# Patient Record
Sex: MALE | Race: WHITE | URBAN - METROPOLITAN AREA
[De-identification: names, ages, dates, MRNs, and addresses within clinical notes are randomized per-mention and may not be internally consistent; named-entity substitution may affect disease eponyms.]

---

## 2017-11-24 ENCOUNTER — HOSPITAL ENCOUNTER (EMERGENCY)
Facility: CLINIC | Age: 32
Discharge: HOME | End: 2017-11-25
Attending: PSYCHIATRY & NEUROLOGY | Admitting: PSYCHIATRY & NEUROLOGY
Payer: COMMERCIAL

## 2017-11-24 DIAGNOSIS — F15.20 METHAMPHETAMINE DEPENDENCE (H): ICD-10-CM

## 2017-11-24 DIAGNOSIS — F11.90 METHADONE USE: ICD-10-CM

## 2017-11-24 LAB — ALCOHOL BREATH TEST: 0 (ref 0–0.01)

## 2017-11-24 PROCEDURE — 99285 EMERGENCY DEPT VISIT HI MDM: CPT | Mod: 25 | Performed by: PSYCHIATRY & NEUROLOGY

## 2017-11-24 PROCEDURE — 99283 EMERGENCY DEPT VISIT LOW MDM: CPT | Mod: Z6 | Performed by: PSYCHIATRY & NEUROLOGY

## 2017-11-24 PROCEDURE — 90791 PSYCH DIAGNOSTIC EVALUATION: CPT

## 2017-11-24 ASSESSMENT — ENCOUNTER SYMPTOMS
DYSPHORIC MOOD: 1
SHORTNESS OF BREATH: 0
HALLUCINATIONS: 0
NERVOUS/ANXIOUS: 1
FEVER: 0
ABDOMINAL PAIN: 0

## 2017-11-24 NOTE — ED AVS SNAPSHOT
[facility], Emergency Department    [address]    [facility] [address]    Phone:  [phone]    Fax:  [phone]                                       Saeed Mccullough   MRN: [id number]    Department:  [facility], Emergency Department   Date of Visit:  11/24/2017           After Visit Summary Signature Page     I have received my discharge instructions, and my questions have been answered. I have discussed any challenges I see with this plan with the nurse or doctor.    ..........................................................................................................................................  Patient/Patient Representative Signature      ..........................................................................................................................................  Patient Representative Print Name and Relationship to Patient    ..................................................               ................................................  Date                                            Time    ..........................................................................................................................................  Reviewed by Signature/Title    ...................................................              ..............................................  Date                                                            Time

## 2017-11-24 NOTE — ED AVS SNAPSHOT
[facility], Emergency Department    [address]    [place] [address]    Phone:  [phone]    Fax:  [phone]                                       Saeed Mccullough   MRN: [id number]    Department:  [facility], Emergency Department   Date of Visit:  11/24/2017           Patient Information     Date Of Birth          1985        Your diagnoses for this visit were:     Methamphetamine dependence (H)     Methadone use (H)        You were seen by Denis Babcock MD.        Discharge Instructions       Go to CD treatment.  Follow up with your [facility]  to get a new CD treatment program set up      24 Hour Appointment Hotline       To make an appointment at any [facility] clinic, call [phone] ([phone]). If you don't have a family doctor or clinic, we will help you find one. [facility] clinics are conveniently located to serve the needs of you and your family.             Review of your medicines      Notice     You have not been prescribed any medications.            Procedures and tests performed during your visit     Alcohol breath test POCT      Orders Needing Specimen Collection     Ordered          11/24/17 2128  Drug abuse screen 6 urine (tox) - STAT, [facility]: STAT, Needs to be Collected     Scheduled Task Status   11/24/17 2129 Collect Drug abuse screen 6 urine (tox) Open   Order Class:  PCU Collect                  Pending Results     No orders found for last 3 day(s).            Pending Culture Results     No orders found for last 3 day(s).            Pending Results Instructions     If you had any lab results that were not finalized at the time of your Discharge, you can call the ED Lab Result RN at [phone]. You will be contacted by this team for any positive Lab results or changes in treatment. The nurses are available 7 days a week from 10A to 6:30P.  You can leave a message 24 hours per day and they will return your call.        Thank you for choosing [facility]      "  Thank you for choosing [facility] for your care. Our goal is always to provide you with excellent care. Hearing back from our patients is one way we can continue to improve our services. Please take a few minutes to complete the written survey that you may receive in the mail after you visit with us. Thank you!        [facility] [employer] Information     3yy game platform lets you send messages to your doctor, view your test results, renew your prescriptions, schedule appointments and more. To sign up, go to [email or web address] . Click on \"Log in\" on the left side of the screen, which will take you to the Welcome page. Then click on \"Sign up Now\" on the right side of the page.     You will be asked to enter the access code listed below, as well as some personal information. Please follow the directions to create your username and password.     Your access code is: [email or web address]  Expires: 2018  9:07 AM     Your access code will  in 90 days. If you need help or a new code, please call your [facility] clinic or [phone].        Care EveryWhere ID     This is your Care EveryWhere ID. This could be used by other organizations to access your [facility] medical records  [id number]        Equal Access to Services     DOUGLAS CARTAGENA : Jayleen Escalera, wafrankyda verenice, qaybta kaalmada adefe, alyssa reddy. So [facility] [phone].    ATENCIÓN: Si habla español, tiene a cristina disposición servicios gratuitos de asistencia lingüística. Llame al [phone].    We comply with applicable federal civil rights laws and [place] laws. We do not discriminate on the basis of race, color, national origin, age, disability, sex, sexual orientation, or gender identity.            After Visit Summary       This is your record. Keep this with you and show to your community pharmacist(s) and doctor(s) at your next visit.                  "

## 2017-11-25 VITALS
OXYGEN SATURATION: 99 % | DIASTOLIC BLOOD PRESSURE: 64 MMHG | TEMPERATURE: 96 F | RESPIRATION RATE: 16 BRPM | HEART RATE: 111 BPM | SYSTOLIC BLOOD PRESSURE: 147 MMHG

## 2017-11-25 NOTE — ED NOTES
Patient arrives to [facility]. Psych Associate explains process, gives patient urine cup and questionnaire. Patient told about meeting with Mental Health  and Psychiatrist. Patient told about 2-5 hour time frame for complete evaluation.

## 2017-11-25 NOTE — ED NOTES
Found passed out at [facility].  Has been off medications. Uses Methadone. Stated he was suicidal and was brought here for an evaluation.

## 2017-11-25 NOTE — ED PROVIDER NOTES
The provider was accepted at shift change sign out with plan to have him reassessed by the [facility]  in the morning.  The  did attempt him prior to shift change, though he seemed quite groggy, and did not provide a useful history.  There were no other acute events overnight.  We will allow him to sleep a little bit longer, and have the morning  attempt to see him.  He may need to get some collateral information from family.  She'll be signed out to the oncoming provider pending the above.    Dictation Disclaimer: Some of this Note has been completed with voice-recognition dictation software. Although errors are generally corrected real-time, there is the potential for a rare error to be present in the completed chart.       Gissel Barrera MD  11/25/17 0786

## 2017-11-25 NOTE — ED NOTES
Bed: ED16  Expected date: 11/24/17  Expected time: 9:15 PM  Means of arrival:   Comments:  Janet Gomez 32M/MELL reyes

## 2017-11-25 NOTE — ED PROVIDER NOTES
History     Chief Complaint   Patient presents with     Suicidal     Found passed out at [facility].  Has been off medications. Uses Methadone. Stated he was suicidal and was brought here for an evaluation.     The history is provided by the patient and medical records.     Saeed Mccullough is a 32 year old male who comes in due to him being found passed out in the bathroom at [facility].  He used some methadone and meth together. He also had one beer.  He has been using daily methamphetamine.  He went to [facility] treatment but relapsed the day he discharged one month ago. He talks about feeling sick (he threw up in the bathroom at [facility]) and using too many drugs. He states he might be suicidal but is not able to state more. He struggles to answer many questions and is lying on the floor. He is too intoxicated to get much information or due much of an assessment.      Please see the 's assessment in [facility] from today for further details.    I have reviewed the Medications, Allergies, Past Medical and Surgical History, and Social History in the Epic system.    Review of Systems   Constitutional: Negative for fever.   Respiratory: Negative for shortness of breath.    Cardiovascular: Negative for chest pain.   Gastrointestinal: Negative for abdominal pain.   Psychiatric/Behavioral: Positive for dysphoric mood and suicidal ideas (passive). Negative for hallucinations and self-injury. The patient is nervous/anxious.    All other systems reviewed and are negative.      Physical Exam   BP: 133/74  Pulse: 111  Temp: 96  F (35.6  C)  Resp: 16  SpO2: 98 %      Physical Exam   Constitutional: He is oriented to person, place, and time. He appears well-developed and well-nourished.   HENT:   Head: Normocephalic and atraumatic.   Mouth/Throat: Oropharynx is clear and moist. No oropharyngeal exudate.   Eyes: Pupils are equal, round, and reactive to light.   Neck: Normal range of motion. Neck supple.  "  Cardiovascular: Normal rate, regular rhythm and normal heart sounds.    Pulmonary/Chest: Effort normal and breath sounds normal. No respiratory distress.   Abdominal: Soft. Bowel sounds are normal. There is no tenderness.   Musculoskeletal: Normal range of motion.   Neurological: He is alert and oriented to person, place, and time.   Skin: Skin is warm. No rash noted.   Psychiatric: His speech is normal and behavior is normal. His affect is inappropriate. He is not actively hallucinating. Thought content is not paranoid and not delusional. Cognition and memory are impaired (due to intoxication). He expresses inappropriate judgment. He exhibits a depressed mood. He expresses suicidal (vague, passive) ideation. He expresses no homicidal ideation. He expresses no suicidal plans and no homicidal plans.   Saeed is a 33 y/o male who looks his age.  He is disheveled.  He has poor eye contact.   Nursing note and vitals reviewed.      ED Course     ED Course     Procedures               Labs Ordered and Resulted from Time of ED Arrival Up to the Time of Departure from the ED   ALCOHOL BREATH TEST POCT - Normal   DRUG ABUSE SCREEN 6 CHEM DEP URINE ([facility])            Assessments & Plan (with Medical Decision Making)   Saeed will sleep in the ED due to his intoxication. He is not able to give much information about his possible suicidal thoughts. He is vague and mostly talks about being \"sick\" and using too many drugs. He most likely will be able to be discharged in the am as once sober, it is expected he will not be suicidal. He will need to have a reassessment at that time to make sure. He should follow up with his [facility]  and get into a CD treatment program.    I have reviewed the nursing notes.    I have reviewed the findings, diagnosis, plan and need for follow up with the patient.    New Prescriptions    No medications on file       Final diagnoses:   Methamphetamine dependence (H)   Methadone use (H) "       11/24/2017   [facility], [place], EMERGENCY DEPARTMENT     Denis Babcock MD  11/24/17 2876

## 2017-11-25 NOTE — CONSULTS
"DEC     This  attempted to meet with pt to assess SI however pt remains impaired and vague and unable to provide coherent, reliable responses to questions.      This  asked if pt was depressed or had any thoughts of hurting himself.  He responded, \"I don't really have any hope.  I don't feel depressed.\"  Pt went to CD tx, a 60-90 day program however left after only 23 days.  \"I left because I didn't want to d the aftercare.  I thought I was better.  Socially I was better.\"  Asked about OP MH providers and pt denied having any.  Asked about medications and pt stated he had decided to stop taking his medications three weeks ago.  \"I missed my appointment with psychiatry,\" however when I inquired about providers he was unable to report who he was scheduled to see.  Pt has his head down in his hands during our conversation and talks to himself with disconnected random statements.    Updated RN and MD.  Pt stated he was hungry so a meal was provided.    Will ask day time  to re-evaluate pt when MS improves.    Gabriela Garcia, MSW, [facility]  "

## 2018-01-09 ENCOUNTER — HOSPITAL ENCOUNTER (INPATIENT)
Facility: CLINIC | Age: 33
LOS: 9 days | Discharge: HOME | End: 2018-01-19
Attending: EMERGENCY MEDICINE | Admitting: PSYCHIATRY & NEUROLOGY
Payer: COMMERCIAL

## 2018-01-09 DIAGNOSIS — F41.9 ANXIETY: ICD-10-CM

## 2018-01-09 DIAGNOSIS — F15.10 METHAMPHETAMINE ABUSE (H): ICD-10-CM

## 2018-01-09 DIAGNOSIS — R45.851 SUICIDE IDEATION: Primary | ICD-10-CM

## 2018-01-09 DIAGNOSIS — T43.632A: ICD-10-CM

## 2018-01-09 DIAGNOSIS — F33.3 SEVERE RECURRENT MAJOR DEPRESSIVE DISORDER WITH PSYCHOTIC FEATURES (H): ICD-10-CM

## 2018-01-09 DIAGNOSIS — R00.0 SINUS TACHYCARDIA: ICD-10-CM

## 2018-01-09 LAB
ALBUMIN SERPL-MCNC: 4 G/DL (ref 3.4–5)
ALP SERPL-CCNC: 65 U/L (ref 40–150)
ALT SERPL W P-5'-P-CCNC: 37 U/L (ref 0–70)
ANION GAP SERPL CALCULATED.3IONS-SCNC: 5 MMOL/L (ref 3–14)
APAP SERPL-MCNC: <2 MG/L (ref 10–20)
AST SERPL W P-5'-P-CCNC: 12 U/L (ref 0–45)
BASOPHILS # BLD AUTO: 0 10E9/L (ref 0–0.2)
BASOPHILS NFR BLD AUTO: 0.1 %
BILIRUB SERPL-MCNC: 0.5 MG/DL (ref 0.2–1.3)
BUN SERPL-MCNC: 14 MG/DL (ref 7–30)
CALCIUM SERPL-MCNC: 9.1 MG/DL (ref 8.5–10.1)
CHLORIDE SERPL-SCNC: 104 MMOL/L (ref 94–109)
CO2 SERPL-SCNC: 32 MMOL/L (ref 20–32)
CREAT SERPL-MCNC: 1.11 MG/DL (ref 0.66–1.25)
DIFFERENTIAL METHOD BLD: ABNORMAL
EOSINOPHIL # BLD AUTO: 0 10E9/L (ref 0–0.7)
EOSINOPHIL NFR BLD AUTO: 0.1 %
ERYTHROCYTE [DISTWIDTH] IN BLOOD BY AUTOMATED COUNT: 11.5 % (ref 10–15)
GFR SERPL CREATININE-BSD FRML MDRD: 78 ML/MIN/1.7M2
GLUCOSE SERPL-MCNC: 100 MG/DL (ref 70–99)
HCT VFR BLD AUTO: 45.7 % (ref 40–53)
HGB BLD-MCNC: 15.8 G/DL (ref 13.3–17.7)
IMM GRANULOCYTES # BLD: 0 10E9/L (ref 0–0.4)
IMM GRANULOCYTES NFR BLD: 0.2 %
LYMPHOCYTES # BLD AUTO: 1.6 10E9/L (ref 0.8–5.3)
LYMPHOCYTES NFR BLD AUTO: 11.1 %
MCH RBC QN AUTO: 31.3 PG (ref 26.5–33)
MCHC RBC AUTO-ENTMCNC: 34.6 G/DL (ref 31.5–36.5)
MCV RBC AUTO: 91 FL (ref 78–100)
MONOCYTES # BLD AUTO: 0.7 10E9/L (ref 0–1.3)
MONOCYTES NFR BLD AUTO: 5 %
NEUTROPHILS # BLD AUTO: 12 10E9/L (ref 1.6–8.3)
NEUTROPHILS NFR BLD AUTO: 83.5 %
NRBC # BLD AUTO: 0 10*3/UL
NRBC BLD AUTO-RTO: 0 /100
PLATELET # BLD AUTO: 329 10E9/L (ref 150–450)
POTASSIUM SERPL-SCNC: 3.5 MMOL/L (ref 3.4–5.3)
PROT SERPL-MCNC: 7.7 G/DL (ref 6.8–8.8)
RBC # BLD AUTO: 5.04 10E12/L (ref 4.4–5.9)
SALICYLATES SERPL-MCNC: <2 MG/DL
SODIUM SERPL-SCNC: 141 MMOL/L (ref 133–144)
WBC # BLD AUTO: 14.3 10E9/L (ref 4–11)

## 2018-01-09 PROCEDURE — 99285 EMERGENCY DEPT VISIT HI MDM: CPT | Mod: 25 | Performed by: EMERGENCY MEDICINE

## 2018-01-09 PROCEDURE — 85025 COMPLETE CBC W/AUTO DIFF WBC: CPT | Performed by: EMERGENCY MEDICINE

## 2018-01-09 PROCEDURE — 80307 DRUG TEST PRSMV CHEM ANLYZR: CPT | Performed by: FAMILY MEDICINE

## 2018-01-09 PROCEDURE — 80053 COMPREHEN METABOLIC PANEL: CPT | Performed by: EMERGENCY MEDICINE

## 2018-01-09 PROCEDURE — 93005 ELECTROCARDIOGRAM TRACING: CPT | Performed by: EMERGENCY MEDICINE

## 2018-01-09 PROCEDURE — 80329 ANALGESICS NON-OPIOID 1 OR 2: CPT | Performed by: EMERGENCY MEDICINE

## 2018-01-09 PROCEDURE — 93010 ELECTROCARDIOGRAM REPORT: CPT | Mod: Z6 | Performed by: EMERGENCY MEDICINE

## 2018-01-09 PROCEDURE — 80320 DRUG SCREEN QUANTALCOHOLS: CPT | Performed by: FAMILY MEDICINE

## 2018-01-09 ASSESSMENT — ENCOUNTER SYMPTOMS
ABDOMINAL PAIN: 0
FEVER: 0
ARTHRALGIAS: 0
HEADACHES: 0
DYSPHORIC MOOD: 1
EYE REDNESS: 0
COLOR CHANGE: 0
DIFFICULTY URINATING: 0
CONFUSION: 0
NECK STIFFNESS: 0
SHORTNESS OF BREATH: 0

## 2018-01-09 NOTE — IP AVS SNAPSHOT
32    [address]    [place] [address]    Phone:  [phone]                                       After Visit Summary   1/9/2018    Saeed Mccullough    MRN: [id number]           After Visit Summary Signature Page     I have received my discharge instructions, and my questions have been answered. I have discussed any challenges I see with this plan with the nurse or doctor.    ..........................................................................................................................................  Patient/Patient Representative Signature      ..........................................................................................................................................  Patient Representative Print Name and Relationship to Patient    ..................................................               ................................................  Date                                            Time    ..........................................................................................................................................  Reviewed by Signature/Title    ...................................................              ..............................................  Date                                                            Time

## 2018-01-09 NOTE — IP AVS SNAPSHOT
MRN:[id number]                      After Visit Summary   1/9/2018    Saeed Mccullough    MRN: [id number]           Thank you!     Thank you for choosing [facility] for your care. Our goal is always to provide you with excellent care.        Patient Information     Date Of Birth          1985        Designated Caregiver       Most Recent Value    Caregiver    Will someone help with your care after discharge? yes    Name of designated caregiver Melissa Mccullough (mother)    Phone number of caregiver [phone]    Caregiver address [address] [place], [place]      About your hospital stay     You were admitted on:  January 10, 2018 You last received care in the:  [facility] 30[facility]    You were discharged on:  January 19, 2018       Who to Call     For medical emergencies, please call 911.  For non-urgent questions about your medical care, please call your primary care provider or clinic, None          Attending Provider     Provider Specialty    Lm Santana MD Emergency Medicine    Adilson Pang MD Psychiatry       Primary Care Provider Fax #    Physician No Ref-Primary [phone]      Further instructions from your care team        Behavioral Discharge Planning and Instructions      Summary:  You were admitted on 1/9/2018  due to sucidal ideation.  You were treated by Dr. Adilson Pang MD and discharged on 01/19/2018 from [facility] to Home with your mother while you await a bed opening at [facility].      Principal Diagnosis:    Unspecified depressive disorder,         Rule out a substance-induced mood disorder (amphetamines).          Rule out a primary mood disorder (MDD versus bipolar disorder),         Rule out obsessive-compulsive disorder.   Stimulant use disorder, amphetamine type substance, severe.   Cannabis use disorder, mild    Health Care Follow-up Appointments:   1. Chemical Dependency Treatment;  You were referred to [facility] residential treatment program.  At the  time of your discharge a bed was not available, however you are to contact [facility] [phone] everyday including weekends to check and see if a bed is available.    2. Medication Management Appointment:  Date: Thursday, February 1st, 2018  Time: 1:30pm   Provider: Aurora Thomas MD  The location for your upcoming appointment:  [facility]  [address]  [address]  [facility] [address]   Phone: [phone]   The Health Unit Coordinator has faxed these discharge instructions to Fax: [phone]  Attend all scheduled appointments with your outpatient providers. Call at least 24 hours in advance if you need to reschedule an appointment to ensure continued access to your outpatient providers.   Major Treatments, Procedures and Findings:  You were provided with: a psychiatric assessment, assessed for medical stability, medication evaluation and/or management, group therapy, individual therapy, CD evaluation/assessment, milieu management and medical interventions    Symptoms to Report: feeling more aggressive, increased confusion, losing more sleep, mood getting worse or thoughts of suicide    Early warning signs can include: increased depression or anxiety sleep disturbances increased thoughts or behaviors of suicide or self-harm  increased unusual thinking, such as paranoia or hearing voices    Safety and Wellness:  Take all medicines as directed.  Make no changes unless your doctor suggests them.      Follow treatment recommendations.  Refrain from alcohol and non-prescribed drugs.  If there is a concern for safety, call 911.    Resources:   COPE (Community Outreach for Psychiatric Emergencies): [phone].Available 24-hours a day, 7 days a week. Call a COPE professional when a severe disturbance of mood or thinking is impacting your safety. COPE professionals are available to go where you are, handle an immediate crisis, and provide a clinical assessment. If needed, COPE will arrange an  emergency evaluation for inpatient psychiatric services. Telephone consultations are also available. Ask them if you meet criteria for a crisis residence.   *You can also talk to COPE about crisis stabilization services if you are experiencing difficulty with the transition from the hospital.  [facility] Acute Psychiatric Services  Acute Psychiatric Services/Crisis Intervention: [phone]  24-hour walk-in crisis intervention and treatment of behavioral emergencies    Located at:  [address] [facility] [address] [facility] -  in the Emergency room on the first floor of the [facility]  [address] [place] [address] [place] [address]  Phone: [phone]  This is a crisis residence where you can stay for a short time if you feel like you need to stabilize but do not need to go to the hospital.    Crisis Connection 24/7 Community Call Center: [phone]  Phone: [phone] outside the [facility] area: [phone]  [email or web address]   Hours: 24 hours/day, 7 days/week  Services: Free and confidential telephone counseling provided by trained volunteers supervised by professional staff. Early intervention and brief supportive counseling for callers with issues of depression, stress and anxiety, domestic violence, parent/child conflicts, family issues, loneliness, grief and loss, suicidal ideation and chronic mental illness.    [facility] [employer] [facility]  You and your family would benefit from the support groups at [facility].   Www.namihelps.org    www.[facility].org    The [employer] [facility] has a parent support and educator staff - Crow [place] - that can be a support for your parents. There are also many classes that are available to you and your family.  Crow can be reached at [phone] xt 106 or through e-mail at [email or web address].    Mya,  please take care and make your recovery a daily  "recovery. The treatment team has appreciated the opportunity to work with you.   If you have any questions or concerns our unit number is [phone].          Pending Results     No orders found from 2018 to 1/10/2018.            Admission Information     Date & Time Provider Department Dept. Phone    2018 Adilson Pang MD [facility] 30NR [phone]      Your Vitals Were     Blood Pressure Pulse Temperature Respirations Height Weight    123/87 (BP Location: Right arm) 95 97  F (36.1  C) 16 1.727 m (5' 8\") 84.8 kg (187 lb)    Pulse Oximetry BMI (Body Mass Index)                96% 28.43 kg/m2          MyChart Information     [facility] lets you send messages to your doctor, view your test results, renew your prescriptions, schedule appointments and more. To sign up, go to [email or web address] . Click on \"Log in\" on the left side of the screen, which will take you to the Welcome page. Then click on \"Sign up Now\" on the right side of the page.     You will be asked to enter the access code listed below, as well as some personal information. Please follow the directions to create your username and password.     Your access code is: [email or web address]  Expires: 2018  9:07 AM     Your access code will  in 90 days. If you need help or a new code, please call your [place] clinic or [phone].        Care EveryWhere ID     This is your Care EveryWhere ID. This could be used by other organizations to access your [facility] [place] medical records  [id number]        Equal Access to Services     [facility]: Hadii wisam patterson Soligia, waaxda luqadaha, qaybta kaalmaalyssa guidry. So [facility] [phone].    ATENCIÓN: Si habla español, tiene a cristina disposición servicios gratuitos de asistencia lingüística. Llame al [phone].    We comply with applicable federal civil rights laws and [place] laws. We do not discriminate on the basis of race, color, national origin, age, " disability, sex, sexual orientation, or gender identity.               Review of your medicines      START taking        Dose / Directions    FLUoxetine 20 MG capsule   Commonly known as:  PROzac   Used for:  Severe recurrent major depressive disorder with psychotic features (H)        Dose:  20 mg   Start taking on:  1/20/2018   Take 1 capsule (20 mg) by mouth daily   Quantity:  30 capsule   Refills:  0       gabapentin 400 MG capsule   Commonly known as:  NEURONTIN   Used for:  Anxiety        Dose:  400 mg   Take 1 capsule (400 mg) by mouth 3 times daily   Quantity:  90 capsule   Refills:  0       traZODone 150 MG tablet   Commonly known as:  DESYREL   Used for:  Severe recurrent major depressive disorder with psychotic features (H)        Dose:  150 mg   Take 1 tablet (150 mg) by mouth nightly as needed for sleep   Quantity:  30 tablet   Refills:  0       trifluoperazine 2 MG tablet   Commonly known as:  STELAZINE   Used for:  Severe recurrent major depressive disorder with psychotic features (H), Anxiety        Dose:  4-8 mg   Take 2-4 tablets (4-8 mg) by mouth 3 times daily as needed (severe anxiety or hallucinations)   Quantity:  30 tablet   Refills:  0         CONTINUE these medicines which may have CHANGED, or have new prescriptions. If we are uncertain of the size of tablets/capsules you have at home, strength may be listed as something that might have changed.        Dose / Directions    OLANZapine 20 MG tablet   Commonly known as:  zyPREXA   This may have changed:    - medication strength  - how much to take  - when to take this   Used for:  Severe recurrent major depressive disorder with psychotic features (H)        Dose:  20 mg   Take 1 tablet (20 mg) by mouth At Bedtime   Quantity:  30 tablet   Refills:  0            Where to get your medicines      These medications were sent to [place], [place] - [address]  [address] [address]     Phone:   [phone]     FLUoxetine 20 MG capsule    gabapentin 400 MG capsule    OLANZapine 20 MG tablet    traZODone 150 MG tablet    trifluoperazine 2 MG tablet                Protect others around you: Learn how to safely use, store and throw away your medicines at www.disposemymeds.org.             Medication List: This is a list of all your medications and when to take them. Check marks below indicate your daily home schedule. Keep this list as a reference.      Medications           Morning Afternoon Evening Bedtime As Needed    FLUoxetine 20 MG capsule   Commonly known as:  PROzac   Take 1 capsule (20 mg) by mouth daily   Start taking on:  1/20/2018   Last time this was given:  20 mg on 1/19/2018  8:48 AM                                gabapentin 400 MG capsule   Commonly known as:  NEURONTIN   Take 1 capsule (400 mg) by mouth 3 times daily   Last time this was given:  300 mg on 1/19/2018  2:32 PM                                OLANZapine 20 MG tablet   Commonly known as:  zyPREXA   Take 1 tablet (20 mg) by mouth At Bedtime   Last time this was given:  20 mg on 1/18/2018  9:29 PM                                traZODone 150 MG tablet   Commonly known as:  DESYREL   Take 1 tablet (150 mg) by mouth nightly as needed for sleep   Last time this was given:  150 mg on 1/18/2018  9:29 PM                                trifluoperazine 2 MG tablet   Commonly known as:  STELAZINE   Take 2-4 tablets (4-8 mg) by mouth 3 times daily as needed (severe anxiety or hallucinations)   Last time this was given:  8 mg on 1/18/2018  9:29 PM

## 2018-01-10 PROBLEM — R45.851 SUICIDE IDEATION: Status: ACTIVE | Noted: 2018-01-10

## 2018-01-10 LAB
AMPHETAMINES UR QL SCN: POSITIVE
BARBITURATES UR QL: NEGATIVE
BENZODIAZ UR QL: NEGATIVE
CANNABINOIDS UR QL SCN: POSITIVE
COCAINE UR QL: NEGATIVE
ETHANOL UR QL SCN: NEGATIVE
INTERPRETATION ECG - MUSE: NORMAL
OPIATES UR QL SCN: NEGATIVE

## 2018-01-10 PROCEDURE — 99223 1ST HOSP IP/OBS HIGH 75: CPT | Mod: AI | Performed by: PSYCHIATRY & NEUROLOGY

## 2018-01-10 PROCEDURE — 25000132 ZZH RX MED GY IP 250 OP 250 PS 637: Performed by: EMERGENCY MEDICINE

## 2018-01-10 PROCEDURE — 25000132 ZZH RX MED GY IP 250 OP 250 PS 637: Performed by: PSYCHIATRY & NEUROLOGY

## 2018-01-10 PROCEDURE — 12400007 ZZH R&B MH INTERMEDIATE UMMC

## 2018-01-10 RX ORDER — OLANZAPINE 5 MG/1
10 TABLET, ORALLY DISINTEGRATING ORAL ONCE
Status: COMPLETED | OUTPATIENT
Start: 2018-01-10 | End: 2018-01-10

## 2018-01-10 RX ORDER — HYDROXYZINE HYDROCHLORIDE 25 MG/1
25-50 TABLET, FILM COATED ORAL EVERY 4 HOURS PRN
Status: DISCONTINUED | OUTPATIENT
Start: 2018-01-10 | End: 2018-01-19 | Stop reason: HOSPADM

## 2018-01-10 RX ORDER — OLANZAPINE 10 MG/1
10 TABLET, ORALLY DISINTEGRATING ORAL DAILY PRN
Status: DISCONTINUED | OUTPATIENT
Start: 2018-01-10 | End: 2018-01-19 | Stop reason: HOSPADM

## 2018-01-10 RX ORDER — OLANZAPINE 10 MG/2ML
10 INJECTION, POWDER, FOR SOLUTION INTRAMUSCULAR DAILY PRN
Status: CANCELLED | OUTPATIENT
Start: 2018-01-10

## 2018-01-10 RX ORDER — OLANZAPINE 10 MG/1
10 TABLET ORAL AT BEDTIME
Status: DISCONTINUED | OUTPATIENT
Start: 2018-01-10 | End: 2018-01-11

## 2018-01-10 RX ORDER — ACETAMINOPHEN 325 MG/1
650 TABLET ORAL EVERY 4 HOURS PRN
Status: DISCONTINUED | OUTPATIENT
Start: 2018-01-10 | End: 2018-01-19 | Stop reason: HOSPADM

## 2018-01-10 RX ORDER — GABAPENTIN 100 MG/1
100 CAPSULE ORAL 3 TIMES DAILY
Status: DISCONTINUED | OUTPATIENT
Start: 2018-01-10 | End: 2018-01-11

## 2018-01-10 RX ORDER — TRAZODONE HYDROCHLORIDE 150 MG/1
150 TABLET ORAL
Status: DISCONTINUED | OUTPATIENT
Start: 2018-01-10 | End: 2018-01-19 | Stop reason: HOSPADM

## 2018-01-10 RX ADMIN — TRAZODONE HYDROCHLORIDE 150 MG: 150 TABLET ORAL at 21:43

## 2018-01-10 RX ADMIN — OLANZAPINE 10 MG: 10 TABLET, FILM COATED ORAL at 21:41

## 2018-01-10 RX ADMIN — GABAPENTIN 100 MG: 100 CAPSULE ORAL at 21:41

## 2018-01-10 RX ADMIN — OLANZAPINE 10 MG: 5 TABLET, ORALLY DISINTEGRATING ORAL at 01:06

## 2018-01-10 RX ADMIN — HYDROXYZINE HYDROCHLORIDE 50 MG: 25 TABLET ORAL at 02:08

## 2018-01-10 ASSESSMENT — ACTIVITIES OF DAILY LIVING (ADL)
AMBULATION: 0-->INDEPENDENT
DRESS: INDEPENDENT;SCRUBS (BEHAVIORAL HEALTH);STREET CLOTHES
RETIRED_COMMUNICATION: 0-->UNDERSTANDS/COMMUNICATES WITHOUT DIFFICULTY
COGNITION: 2 - DIFFICULTY WITH ORGANIZING THOUGHTS
GROOMING: INDEPENDENT
TOILETING: 0-->INDEPENDENT
RETIRED_EATING: 0-->INDEPENDENT
DRESS: STREET CLOTHES;INDEPENDENT
DRESS: 0-->INDEPENDENT
LAUNDRY: UNABLE TO COMPLETE
ORAL_HYGIENE: INDEPENDENT
FALL_HISTORY_WITHIN_LAST_SIX_MONTHS: NO
SWALLOWING: 0-->SWALLOWS FOODS/LIQUIDS WITHOUT DIFFICULTY
LAUNDRY: UNABLE TO COMPLETE
BATHING: 0-->INDEPENDENT
GROOMING: INDEPENDENT
TRANSFERRING: 0-->INDEPENDENT
ORAL_HYGIENE: INDEPENDENT
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
## 2018-01-10 NOTE — ED NOTES
"Patient hitting self in head with tissue box, yelling \"F--k!\"  Patient informed not to hit/harm self, verbalized understanding.  Will continue to monitor.  "
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
## 2018-01-10 NOTE — H&P
"INITIAL PSYCHIATRIC HISTORY AND PHYSICAL       DATE OF ASSESSMENT:  01/10/2018      IDENTIFYING INFORMATION:  The patient is a 32-year-old single  male currently residing with his mother.      CHIEF COMPLAINT:  \"I get really bad thoughts and I just cannot take it sometimes.\"      HISTORY OF PRESENT ILLNESS:  The patient has a history of prominent methamphetamine usage, further complicated by mood and possibly psychotic symptoms.  He was brought to the emergency room by EMS after being found on the [address] [facility] bridge with the intent to jump to commit suicide.  Records indicate that he had also ingested stimulants and methamphetamine prior to the incident.  He was placed on a 72-hour hold and transferred to our behavioral health unit.        On examination today, he tells me that approximately 3 months ago he was hospitalized for his first mental health hospitalization following his first suicide attempt via overdose on medications.  After spending a few days at the [facility], his mental health symptoms were stabilized on a combination of Zyprexa and gabapentin.  He vaguely recalls being diagnosed with schizophrenia at that time.  After gaining stability, he was transitioned to the [facility] chemical addiction treatment facility where he maintained residence for a little over 20 days.  During that time he recalls that he felt very well, maintaining mood stability and eventually felt that he no longer needed the support of a facility given the improvements he had made.  He then discharged himself from the facility and returned back home with his mother.  He discontinued the medication on his own, questioning whether he needed them and sometime afterwards intrusive thoughts reemerged.  He describes these intrusive thoughts as being his primary mental health symptom.  When these thoughts occur, he experiences thoughts of wanting to rape people, although is not able to identify any " specific person.  He does not desire acting on these thoughts and therefore experiences much emotional distress as he attempts to minimize these thoughts as much as possible.  He has attempted to utilize pornography to distract himself from these thoughts in the past, however, to no avail.  More recently, he has been using illicit substances, mostly in the form of smoking methamphetamine, to help distract himself from the intensity of these thoughts.  On the day of his admission, these thoughts had intensified to where he no longer desired living if he needed to keep experiencing these intrusive thoughts.  He then contemplated suicide as a means of escaping this occurrence and had utilized stimulants and methamphetamine, then proceeded with a plan to go to the bridge while contemplating jumping as means of suicide.  Today, the intensity of symptoms has subsided some to where he is not endorsing any active suicidal thoughts.  He is hopeful to regain medications which have been helpful for him in the past and is looking forward to regaining referrals for residential treatment.      PSYCHIATRIC REVIEW OF SYSTEMS:  Regarding a depressive episode, he endorsed depressed mood, characterized as severe, mostly secondary to distress associated with ruminative thoughts of rape.  He denied anhedonia.  He does feel helpless and hopeless.  Energy is low, however, complicated by stimulant withdrawal.  Concentration is poor.  Appetite is low.  Again, he denies active suicidal and homicidal thoughts.  He was not able to endorse manic symptoms unrelated to substance usage.  Regarding psychosis, he denied auditory hallucinations; however, described a strong presence of thoughts and hearing his own conscious influencing intrusive thoughts of rape.  He denied overt paranoid delusions or visual hallucinations or thought insertion, thought extraction, no ideas of reference.  No symptoms corresponding to PTSD, KEESHA, eating disorder.   Regarding OCD, he described intrusive obsessional thoughts pertaining to raping others.  He described these thoughts as emerging without any environmental trigger.  He finds these thoughts intrusive and uncomfortable to experience.  He clarifies no actual intent or desire to act out these thoughts.  These thoughts cause him emotional distress, that he has resorted to pornography to help alleviate the intensities.  No clear compulsions identified.  He tells me that he has not acted on these thoughts to rape others.      PAST PSYCHIATRIC HISTORY:  One prior inpatient hospitalization a few months ago through the [facility]'s mental health unit following his first suicide attempt where he overdosed on a handful of an unknown medication.  He currently does not have any outpatient mental health providers.  He was previously prescribed a combination of Zyprexa and gabapentin during his last hospitalization and found those to be helpful.  No history of SIB reported.  No history of legal commitments reported.      SUBSTANCE ABUSE HISTORY:  Drug of choice is methamphetamines which he has been using on and off over the past 3 or 4 years.  He typically smokes the substance.  He denied injecting illicit substances, denied significant alcohol usage.  He did report occasional cannabis usage with no specific pattern of use or dependence.  He has been to treatment 1 time through [facility] approximately 2 months ago.      MEDICAL HISTORY:  No active issues.      FAMILY HISTORY:  He suspects that a parent and cousin have mental illness, however, is not certain of a specific diagnosis.  No knowledge of suicides in the family.      SOCIAL HISTORY:  Refer to the psychosocial assessment completed by our .  He tells me that he is currently single, does not have children and resides with his mother.  He is unemployed and is not in any romantic relationships at the moment.  He denied any legal issues.      MEDICAL REVIEW OF  SYSTEMS:  A 10-point systems were reviewed and were positive for psychiatric symptoms as noted above, otherwise negative.      PHYSICAL EXAMINATION:   VITAL SIGNS:  Blood pressure is 135/89, respirations 16, pulse 98, temperature 97.4.  Weight is 182 pounds.      The rest of the physical examination was reviewed in the emergency room documentation.      MENTAL STATUS EXAMINATION:  The patient appears his stated age, appropriately dressed in hospital scrubs, lying in bed, unkempt, disheveled.  Eye contact was limited, mostly looking down.  He woke up from his sleep and sat up in bed.  He would frequently scratch his head quite intensely in an anxious fashion.  Most responses were limited to 3 or 4 words at most.  Speech was soft in volume, not pushed or pressured.  No psychomotor abnormalities.  Mood was described as being sad.  Affect was blunted.  Thought process was linear and tight.  Thought content did not display evidence of overt psychosis.  He did not appear paranoid.  He did not appear to be responding to any psychotic stimuli.  He denied active suicidal and homicidal thoughts.  Associations were intact.  Insight and judgment appear fair.  Cognition appeared intact to interviewer including orientation to person, place, time and situation, use of language, fund of knowledge, recent and remote memory.  Muscle strength, tone and gait appear normal on visual inspection.      ASSESSMENT:   1.  Unspecified depressive disorder,         Rule out a substance-induced mood disorder (amphetamines).          Rule out a primary mood disorder (MDD versus bipolar disorder),         Rule out obsessive-compulsive disorder.   2.  Stimulant use disorder, amphetamine type substance, severe.   3.  Cannabis use disorder, mild     PLAN:   1.  The patient has been admitted to [facility] under a 72-hour hold for depressed mood and suicidal thoughts.  Treatment will be continued voluntarily.   2.  The patient reports gaining a  positive response from a combination of Zyprexa and gabapentin to address mood and anxiety symptoms.  I will restart these medications as we continue assessments to further evaluate his diagnosis to ensure appropriate treatment plan.   3.  I discussed with him pursuing neuropsychological testing to help gain diagnostic clarification, which he was agreeable for.  I will await reduction of amphetamine withdrawal symptoms before pursuing the evaluation to ensure good cooperation and accurate testing.   4.  Labs were reviewed including a drug screen positive for cannabis and amphetamines.  CMP and CBC are within normal limits.  We will also order a TSH to rule out other potential factors complicating mood symptoms along with a lipid panel for baseline functioning as we plan to initiate Zyprexa.   5.  Psychosocial treatments to be addressed with social work consult and groups.  The patient is agreeable to initiate a chemical health assessment to explore residential treatment options.   6.  Anticipated hospital stay of 1 week as we target improvement in mood with remission of suicidal thoughts and formulate a plan of care to affectively transition his care out of the hospital.         PARKER RODRIGUEZ MD             D: 01/10/2018 13:15   T: 01/10/2018 14:08   MT: SK      Name:     BRANDIN RUIZ   MRN:      [id number]        Account:      [id number]   :      1985           Admitted:     [id number]      Document: [id number]

## 2018-01-10 NOTE — PROGRESS NOTES
"Pt was in the milieu only for meals.  He denies SIB.  Confirms SI thoughts.  Pt wants to talk with CTC to find out about a place to live.  \"My priority today is to just get some rest.\"  "

## 2018-01-10 NOTE — ED PROVIDER NOTES
"  History     Chief Complaint   Patient presents with     Suicidal     Suicidal with plans to jump off a bridge     Drug Overdose     Took 9 of mom's pills \"methylmine\"at 1200, patient states he was \"trying to change my attitude\"     Homicidal     HI towards \"you and everyone\"     HPI  Saeed Mccullough is a 32 year old male who presents to the emergency department via EMS with concern for suicide ideations.  The [place] [address] [place] attempting to [place].  EMS was subsequently called and patient was transported here to the emergency department.  Patient states he has been having increasing suicide ideations for the past day.  He states that he took 9 of his mother's methylphenidate pills at noon today.  He states that he was doing this to try to \"amp myself up\" in order to cut his wrists.  He denies any other ingestion.  He reports a history of depression but does not have any outpatient providers and does not take any antidepressants.  He reports ongoing methamphetamine abuse.  He states he last snorted meth yesterday.  He denies any hallucinations.  He denies alcohol use.  Patient denies any symptoms currently.  He denies any chest pain or dyspnea.  No abdominal pain.  No nausea or vomiting.  He denies any recent illness.  He denies any medical concerns.    I have reviewed the Medications, Allergies, Past Medical and Surgical History, and Social History in the Epic system.    Review of Systems   Constitutional: Negative for fever.   HENT: Negative for congestion.    Eyes: Negative for redness.   Respiratory: Negative for shortness of breath.    Cardiovascular: Negative for chest pain.   Gastrointestinal: Negative for abdominal pain.   Genitourinary: Negative for difficulty urinating.   Musculoskeletal: Negative for arthralgias and neck stiffness.   Skin: Negative for color change.   Neurological: Negative for headaches.   Psychiatric/Behavioral: Positive for dysphoric mood and suicidal ideas. Negative " for confusion.   All other systems reviewed and are negative.      Physical Exam   BP: 138/82  Pulse: 111  Temp: 98  F (36.7  C)  Resp: 16  SpO2: 96 %      Physical Exam   Constitutional: He is oriented to person, place, and time. He appears well-developed and well-nourished. No distress.   HENT:   Head: Normocephalic and atraumatic.   Mouth/Throat: Oropharynx is clear and moist. No oropharyngeal exudate.   Eyes: Pupils are equal, round, and reactive to light. No scleral icterus.   Cardiovascular: Regular rhythm, normal heart sounds and intact distal pulses.  Tachycardia present.    Pulmonary/Chest: Effort normal and breath sounds normal. No respiratory distress.   Abdominal: Soft. Bowel sounds are normal. There is no tenderness.   Musculoskeletal: Normal range of motion. He exhibits no edema or tenderness.   Neurological: He is alert and oriented to person, place, and time. He has normal strength. Coordination normal.   Skin: Skin is warm and dry. No rash noted. He is not diaphoretic.   Psychiatric: He exhibits a depressed mood. He expresses suicidal ideation.   Nursing note and vitals reviewed.      ED Course     ED Course     Procedures             EKG Interpretation:      Interpreted by DANIEL TOURE MD  Time reviewed: 2021  Symptoms at time of EKG: Ingestion   Rhythm: sinus tachycardia  Rate: 110  Axis: Normal  Ectopy: none  Conduction: normal  ST Segments/ T Waves: T wave flattening Global  Q Waves: none  Comparison to prior: Flattened T waves otherwise unchanged from 8/15/12    Clinical Impression: Sinus tachycardia with flattened T waves    Results for orders placed or performed during the hospital encounter of 01/09/18 (from the past 24 hour(s))   EKG 12 lead   Result Value Ref Range    Interpretation ECG Click View Image link to view waveform and result    CBC with platelets differential   Result Value Ref Range    WBC 14.3 (H) 4.0 - 11.0 10e9/L    RBC Count 5.04 4.4 - 5.9 10e12/L    Hemoglobin 15.8 13.3 -  17.7 g/dL    Hematocrit 45.7 40.0 - 53.0 %    MCV 91 78 - 100 fl    MCH 31.3 26.5 - 33.0 pg    MCHC 34.6 31.5 - 36.5 g/dL    RDW 11.5 10.0 - 15.0 %    Platelet Count 329 150 - 450 10e9/L    Diff Method Automated Method     % Neutrophils 83.5 %    % Lymphocytes 11.1 %    % Monocytes 5.0 %    % Eosinophils 0.1 %    % Basophils 0.1 %    % Immature Granulocytes 0.2 %    Nucleated RBCs 0 0 /100    Absolute Neutrophil 12.0 (H) 1.6 - 8.3 10e9/L    Absolute Lymphocytes 1.6 0.8 - 5.3 10e9/L    Absolute Monocytes 0.7 0.0 - 1.3 10e9/L    Absolute Eosinophils 0.0 0.0 - 0.7 10e9/L    Absolute Basophils 0.0 0.0 - 0.2 10e9/L    Abs Immature Granulocytes 0.0 0 - 0.4 10e9/L    Absolute Nucleated RBC 0.0    Comprehensive metabolic panel   Result Value Ref Range    Sodium 141 133 - 144 mmol/L    Potassium 3.5 3.4 - 5.3 mmol/L    Chloride 104 94 - 109 mmol/L    Carbon Dioxide 32 20 - 32 mmol/L    Anion Gap 5 3 - 14 mmol/L    Glucose 100 (H) 70 - 99 mg/dL    Urea Nitrogen 14 7 - 30 mg/dL    Creatinine 1.11 0.66 - 1.25 mg/dL    GFR Estimate 78 >60 mL/min/1.7m2    GFR Estimate If Black >90 >60 mL/min/1.7m2    Calcium 9.1 8.5 - 10.1 mg/dL    Bilirubin Total 0.5 0.2 - 1.3 mg/dL    Albumin 4.0 3.4 - 5.0 g/dL    Protein Total 7.7 6.8 - 8.8 g/dL    Alkaline Phosphatase 65 40 - 150 U/L    ALT 37 0 - 70 U/L    AST 12 0 - 45 U/L   Acetaminophen level   Result Value Ref Range    Acetaminophen Level <2 mg/L   Salicylate level   Result Value Ref Range    Salicylate Level <2 mg/dL                  Critical Care time:    8:51 PM Discussed with Poison Control     10:40 PM Heart rate normalized.    Assessments & Plan (with Medical Decision Making)   32 year old male with history of methamphetamine abuse to the emergency department with suicide ideation.  [place] [address] [place].  The patient reports ongoing suicide ideation here in the emergency department.  He also ingested 9 methylphenidate earlier today.   The patient was initially tachycardic but that normalized.  He is otherwise asymptomatic and does not appear stimulant intoxicated.  The patient has a normal exam.  He does not have any medical concerns.  He appears medically stable for psychiatric admission.  Placed on 72 hour hold.    I have reviewed the nursing notes.    I have reviewed the findings, diagnosis, plan and need for follow up with the patient.    New Prescriptions    No medications on file       Final diagnoses:   Suicide ideation   Methamphetamine abuse       1/9/2018   [facility], [place], EMERGENCY DEPARTMENT     Lm Santana MD  01/10/18 0015

## 2018-01-10 NOTE — PROGRESS NOTES
01/10/18 0132   Patient Belongings   Did you bring any home meds/supplements to the hospital?  No   Patient Belongings clothing;wallet;other (see comments);money (see comment);shoes   Disposition of Belongings Locker   Belongings Search Yes   Clothing Search Yes   Second Staff Kenneth SWENSON   General Info Comment Patient came in with a black rosmery pant, red shirt, purple winter coat, brown wallet, a white tennis shoe and a pink head band.   Money: One EBT [place] card ending in ....5627 and $20 cash. Money in security envelope # [id number]   A               Admission:  I am responsible for any personal items that are not sent to the safe or pharmacy.  [facility] is not responsible for loss, theft or damage of any property in my possession.    Signature:  _________________________________ Date: _______  Time: _____                                              Staff Signature:  ____________________________ Date: ________  Time: _____      2nd Staff person, if patient is unable/unwilling to sign:    Signature: ________________________________ Date: ________  Time: _____     Discharge:  [facility] has returned all of my personal belongings:    Signature: _________________________________ Date: ________  Time: _____                                          Staff Signature:  ____________________________ Date: ________  Time: _____

## 2018-01-10 NOTE — PROGRESS NOTES
CTC went to patient's room to conduct initial psychosocial assessment.  Patient was in bed at 3:51pm with the lights off and was laying with his eyes closed.  He responded right away when his name was called.  He stated that he would like to meet with CTC, however he wanted to meet tomorrow.  CTC was aware that pt is withdrawing from Meth and knew that pt wanted to spend the day resting which is why the CTC attempted to meet with him later in the day.  CTC would try to check in with pt, tomorrow afternoon.  Pt was made aware of this plan and agreed.

## 2018-01-10 NOTE — ED NOTES
"[place] [address] [place], patient reports he took 9 of his mother's pills at 1200 today to \"change my attitude\", denies this being a suicide attempt.  "

## 2018-01-11 PROCEDURE — 12400007 ZZH R&B MH INTERMEDIATE UMMC

## 2018-01-11 PROCEDURE — 25000132 ZZH RX MED GY IP 250 OP 250 PS 637: Performed by: EMERGENCY MEDICINE

## 2018-01-11 PROCEDURE — 99233 SBSQ HOSP IP/OBS HIGH 50: CPT | Performed by: PSYCHIATRY & NEUROLOGY

## 2018-01-11 PROCEDURE — 25000132 ZZH RX MED GY IP 250 OP 250 PS 637: Performed by: PSYCHIATRY & NEUROLOGY

## 2018-01-11 RX ORDER — GABAPENTIN 300 MG/1
300 CAPSULE ORAL 3 TIMES DAILY
Status: DISCONTINUED | OUTPATIENT
Start: 2018-01-11 | End: 2018-01-19 | Stop reason: HOSPADM

## 2018-01-11 RX ORDER — OLANZAPINE 7.5 MG/1
15 TABLET, FILM COATED ORAL AT BEDTIME
Status: DISCONTINUED | OUTPATIENT
Start: 2018-01-11 | End: 2018-01-16

## 2018-01-11 RX ADMIN — GABAPENTIN 100 MG: 100 CAPSULE ORAL at 08:52

## 2018-01-11 RX ADMIN — OLANZAPINE 10 MG: 10 TABLET, ORALLY DISINTEGRATING ORAL at 13:01

## 2018-01-11 RX ADMIN — TRAZODONE HYDROCHLORIDE 150 MG: 150 TABLET ORAL at 21:47

## 2018-01-11 RX ADMIN — GABAPENTIN 300 MG: 300 CAPSULE ORAL at 21:45

## 2018-01-11 RX ADMIN — OLANZAPINE 15 MG: 7.5 TABLET, FILM COATED ORAL at 21:45

## 2018-01-11 RX ADMIN — GABAPENTIN 300 MG: 300 CAPSULE ORAL at 13:01

## 2018-01-11 ASSESSMENT — ACTIVITIES OF DAILY LIVING (ADL)
DRESS: INDEPENDENT;STREET CLOTHES
HYGIENE/GROOMING: HANDWASHING;SHOWER;INDEPENDENT
LAUNDRY: WITH SUPERVISION
ORAL_HYGIENE: INDEPENDENT

## 2018-01-11 NOTE — PROGRESS NOTES
01/10/18 2107   Behavioral Health   Hallucinations other (see comment)  (ua)   Thinking other (see comment)  (ua)   Orientation other (see comment)  (ua)   Memory other (see comment)  (ua)   Insight other (see comment)  (ua)   Judgement (ua)   Eye Contact at examiner   Affect other (see comments)  (ua)   Mood mood is calm;other (see comments)  (sleep)   Physical Appearance/Attire disheveled   Hygiene neglected grooming - unclean body, hair, teeth   Suicidality other (see comments)  (ua)   Self Injury other (see comment)  (ua)   Activity other (see comment)  (sleep)   Speech clear;coherent   Medication Sensitivity no observed side effects;no stated side effects   Psychomotor / Gait steady;balanced     Pt. Sleeping/napping all shift. Pt. Did not eat meals.

## 2018-01-11 NOTE — PROGRESS NOTES
01/10/18 2107   Behavioral Health   Hallucinations other (see comment)  (ua)   Thinking other (see comment)  (ua)   Orientation other (see comment)  (ua)   Memory other (see comment)  (ua)   Insight other (see comment)  (ua)   Judgement (ua)   Eye Contact at examiner   Affect other (see comments)  (ua)   Mood mood is calm;other (see comments)  (sleep)   Physical Appearance/Attire disheveled   Hygiene neglected grooming - unclean body, hair, teeth   Suicidality other (see comments)  (ua)   Self Injury other (see comment)  (ua)   Activity other (see comment)  (sleep)   Speech coherent   Medication Sensitivity no observed side effects   Psychomotor / Gait (sleep)     Pt. Sleeping/napping all shift. Pt. Did not eat meals.

## 2018-01-11 NOTE — PROGRESS NOTES
"Initial Psychosocial Assessment    I have reviewed the chart, met with the patient, and developed Care Plan.  Information for assessment was obtained from: patient interview and chart.      Presenting Problem:  \"Having not good thoughts\".    History of Mental Health and Chemical Dependency:    Pt denies any history of depression, anxiety, or other mental health problems. Went to [facility] a week ago because of the voices.  Stayed a week and could not get the meds because the pharmacy would not dispense to patient.  He denied any history of mental health issues, however also stated that he has been having problematic thoughts for the past year.   Meth for 5 years.  Attended [facility] for treatment about \"a month ago\".  He reported that he did not graduate.  At first stated that nothing will help him, but when asked directly stated that he would be open to treatment.      Family Description (Constellation, Family Psychiatric History):  Single,no children.      Significant Life Events (Illness, Abuse, Trauma, Death):  Denied history of all forms of abuse and trauma.      Living Situation:  Pt lives with his mother and his older brother.      Educational Background:  High school graduate; some college education    Occupational History:  Unemployment; Last worked 2-3yrs.  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 Pt reported that he \"quit, I guess\".     Financial Status:  Mother is helping provide needs for patient.    Legal Issues:  Denied current legal issues    Ethnic/Cultural Issues:  No cultural/ethnic issues that would impact treatment.      Spiritual Orientation:  Pt identifies as \"Protestant\"      Service History:  He is not a .      Social Functioning (organization, interests):  Unable to assess.      Current Treatment Providers are:  No current treatment providers. Pt stated that he is open to referral at the time of discharge.      Social Service Assessment/Plan:  Psych Tech " "retrieved patient from his room.  He was sleeping.  During the interview his eyes were closed and his head was nodding as if he were slightly sleeping.  He answered questions, though responses were brief.  Not a good historian as pt was giving estimates regarding timeline of hospitalizations.  When asked what pt believe he needs from the treatment team he stated \"nothing is going to help\".  He then stated that he wanted to leave.  When asked directly about CD treatment he agreed have CD assessment and go to treatment, however he did not seem interested.  Treatment team has recommended CD assessment and treatment and medications to target the delusional thoughts that patient reported that he has been hearing for the past year.    "

## 2018-01-11 NOTE — PLAN OF CARE
Problem: Patient Care Overview  Goal: Team Discussion  Team Plan:   Outcome: No Change  BEHAVIORAL TEAM DISCUSSION    Participants: Adilson Pang MD; DONG-GLADYS Gracia, [facility]; Akosua Dunaway RN  Progress: Pt is currently on day 1 of hospitalization.  He was admitted due to suicidal thoughts and substance use in the form of Meth.  Pt was in bed yesterday and today and has had minimal interaction with staff.  Pt did open up when speaking to MD and verbalized intrusive thoughts about wanting to rape people.  Pt has been restarted on Zyprexa and gabapentin.  He reported that he has been using meth for the past 5 years.  He does not seem interested in treatment, but stated that he would go.    Continued Stay Criteria/Rationale: psychotic symptoms and suicidal ideation  Medical/Physical: no acute issues.  Precautions:   Behavioral Orders   Procedures     Code 1 - Restrict to Unit     Routine Programming     As clinically indicated     Sexual precautions     Intrusive thoughts of sexual assault toward others. No known history of acting on these thoughts.     Status 15     Every 15 minutes.     Suicide precautions     Plan:   The patient reports gaining a positive response from a combination of Zyprexa and gabapentin to address mood and anxiety symptoms.  I will restart these medications as we continue assessments to further evaluate his diagnosis to ensure appropriate treatment plan.     I discussed with him pursuing neuropsychological testing to help gain diagnostic clarification, which he was agreeable for.  I will await reduction of amphetamine withdrawal symptoms before pursuing the evaluation to ensure good cooperation and accurate testing.    Labs were reviewed including a drug screen positive for cannabis and amphetamines.  CMP and CBC are within normal limits.  We will also order a TSH to rule out other potential factors complicating mood symptoms along with a lipid panel for baseline functioning as we  plan to initiate Zyprexa.   Psychosocial treatments to be addressed with social work consult and groups.  The patient is agreeable to initiate a chemical health assessment to explore residential treatment options.   Anticipated hospital stay of 1 week as we target improvement in mood with remission of suicidal thoughts and formulate a plan of care to affectively transition his care out of the hospital.   Rationale for change in precautions or plan: placed on sexual precautions due to thoughts.

## 2018-01-11 NOTE — PROGRESS NOTES
"   01/11/18 1415   Behavioral Health   Hallucinations denies / not responding to hallucinations   Thinking distractable;delusional;poor concentration  (C/O intrusive thoughts as being \"worse than ever\")   Orientation person: oriented;place: oriented   Insight admits / accepts;insight appropriate to situation   Judgement impaired   Eye Contact into space;staring   Affect blunted, flat;tense   Mood depressed;hopeless;anxious   Physical Appearance/Attire appears stated age;attire appropriate to age and situation;neat   Hygiene other (see comment)  (adequate)   Suicidality chronic thoughts with no stated plan   1. Wish to be Dead Yes   Wish to be Dead Description (\"...if intrusive thoughts can't be managed better...\")   Self Injury other (see comment)  (SIB urges not endorsed)   Elopement (no indicators this shift)   Activity isolative;withdrawn;other (see comment)  (in bed most of shift)   Speech clear;coherent;other (see comments)  (guarded, high latency, mnimally informative)   Psychomotor / Gait balanced;steady   Sleep/Rest/Relaxation   Day/Evening Time Hours napping;resting in bed   Safety   Suicidality Status 15   Coping/Psychosocial   Verbalized Emotional State depression;hopelessness;suicidal thoughts   Groups   Details no group involvement   Behavioral Health Interventions   Depression maintain safety precautions;maintain safe secure environment;establish therapeutic relationship;assist with developing and utilizing healthy coping strategies;build upon strengths;monitor confusion, memory loss, decision making ability and reorient / intervene as needed   Social and Therapeutic Interventions (Depression) encourage socialization with peers;encourage participation in therapeutic groups and milieu activities     "

## 2018-01-11 NOTE — PROGRESS NOTES
"[facility], [place]   Psychiatric Progress Note         Interim History and Subjective Reports:   The patient's care was discussed with the treatment team during the daily team meeting.  Staff reports: no acute issues    No significant changes today other than reporting he slept very well last night.  Mood remains depressed.  Mostly influenced by intrusive thoughts as previously mentioned.  No intention on acting on them.  Suicidal thoughts are present.  No active plan or intent reported.  He is able to contract for safety.  Denied homicidal thoughts.  He denied auditory and visual hallucinations however did not reference his conscience having a lot of presents.  Energy is low, appetite is low, concentration is poor.  Tolerating medications without side effects.           Scheduled Medications:       OLANZapine  10 mg Oral At Bedtime     gabapentin  100 mg Oral TID          Allergies:    No Known Allergies       Labs:   No results found for this or any previous visit (from the past 24 hour(s)).       Vitals:   /89  Pulse 98  Temp 96  F (35.6  C)  Resp 16  Ht 1.727 m (5' 8\")  Wt 81.2 kg (179 lb)  SpO2 96%  BMI 27.22 kg/m2  Weight: 179 lbs 0 oz          Height: 5' 8\"           Body mass index is 27.22 kg/(m^2).        Mental Status Examination:   Appearance: poorly groomed and fatigued  Attitude:  cooperative  Eye Contact:  poor   Mood:  depressed  Affect:  intensity is blunted  Speech:  clear, coherent  Psychomotor Behavior:  no evidence of tardive dyskinesia, dystonia, or tics  Throught Process:  linear  Associations:  no loose associations  Thought Content:  no evidence of psychotic thought and active suicidal ideation present  Insight:  fair  Judgement:  intact  Oriented to:  time, person, and place  Attention Span and Concentration:  fair  Recent and Remote Memory:  intact         DIagnoses:     1.  Unspecified depressive disorder,         Rule out a substance-induced " mood disorder (amphetamines).          Rule out a primary mood disorder (MDD versus bipolar disorder),         Rule out obsessive-compulsive disorder.   2.  Stimulant use disorder, amphetamine type substance, severe.   3.  Cannabis use disorder, mild         Plan:   1. Biological treatments:  --Continue current medications as we monitor response.  The dose of Zyprexa will be increased for better effectiveness.  Gabapentin will be increased for anxiety reduction.  Tolerating the current doses well.  --His current presentation may be complicated by amphetamine withdrawal.  We will continue to assess as withdrawal symptoms diminished over the next few days.    2. Psychosocial treatments:   --addressed with SW consult and groups  --Consider neuropsychological testing for diagnostic clarification once withdrawal symptoms have subsided  --Obtain a chemical health assessment    3. Dispo:  --Attempt to transition directly into a residential CD treatment facility

## 2018-01-12 LAB
CHOLEST SERPL-MCNC: 187 MG/DL
HDLC SERPL-MCNC: 35 MG/DL
LDLC SERPL CALC-MCNC: 126 MG/DL
NONHDLC SERPL-MCNC: 152 MG/DL
TRIGL SERPL-MCNC: 128 MG/DL
TSH SERPL DL<=0.005 MIU/L-ACNC: 2.89 MU/L (ref 0.4–4)

## 2018-01-12 PROCEDURE — 80061 LIPID PANEL: CPT | Performed by: PSYCHIATRY & NEUROLOGY

## 2018-01-12 PROCEDURE — 12400007 ZZH R&B MH INTERMEDIATE UMMC

## 2018-01-12 PROCEDURE — 25000132 ZZH RX MED GY IP 250 OP 250 PS 637: Performed by: PSYCHIATRY & NEUROLOGY

## 2018-01-12 PROCEDURE — 84443 ASSAY THYROID STIM HORMONE: CPT | Performed by: PSYCHIATRY & NEUROLOGY

## 2018-01-12 PROCEDURE — 36415 COLL VENOUS BLD VENIPUNCTURE: CPT | Performed by: PSYCHIATRY & NEUROLOGY

## 2018-01-12 RX ADMIN — GABAPENTIN 300 MG: 300 CAPSULE ORAL at 23:21

## 2018-01-12 RX ADMIN — TRAZODONE HYDROCHLORIDE 150 MG: 150 TABLET ORAL at 23:25

## 2018-01-12 RX ADMIN — OLANZAPINE 15 MG: 7.5 TABLET, FILM COATED ORAL at 23:20

## 2018-01-12 RX ADMIN — GABAPENTIN 300 MG: 300 CAPSULE ORAL at 13:59

## 2018-01-12 RX ADMIN — GABAPENTIN 300 MG: 300 CAPSULE ORAL at 08:52

## 2018-01-12 ASSESSMENT — ACTIVITIES OF DAILY LIVING (ADL)
LAUNDRY: WITH SUPERVISION
GROOMING: HANDWASHING;INDEPENDENT
DRESS: STREET CLOTHES;INDEPENDENT
ORAL_HYGIENE: INDEPENDENT

## 2018-01-12 NOTE — PROGRESS NOTES
Pt was isolated in his room the majority of the evening. Pt denies SI and SIB.       01/11/18 3684   Behavioral Health   Hallucinations denies / not responding to hallucinations   Thinking distractable;poor concentration   Orientation person: oriented;place: oriented;date: oriented;time: oriented   Memory baseline memory   Insight poor   Judgement impaired   Eye Contact at examiner   Affect blunted, flat   Mood mood is calm   Physical Appearance/Attire attire appropriate to age and situation   Hygiene neglected grooming - unclean body, hair, teeth   Suicidality other (see comments)  (Denies)   1. Wish to be Dead No   2. Non-Specific Active Suicidal Thoughts  No   3. Active Sucidal Ideation with any Methods (Not Plan) Without Intent to Act  No   4. Active Suicidal Ideation with Some Intent to Act, Without Specific Plan  No   5. Active Suicidal Ideation with Specific Plan and Intent  No   Self Injury other (see comment)  (None Observed)   Activity isolative   Speech clear;coherent   Medication Sensitivity no stated side effects;no observed side effects   Psychomotor / Gait balanced;steady   Coping/Psychosocial   Verbalized Emotional State frustration   Activities of Daily Living   Hygiene/Grooming handwashing;shower;independent   Oral Hygiene independent   Dress independent;street clothes   Laundry with supervision   Room Organization independent   Behavioral Health Interventions   Depression maintain safety precautions;maintain safe secure environment;assist patient in developing safety plan;assist patient in following safety plan;encourage nutrition and hydration;encourage participation / independence with adls;provide emotional support;establish therapeutic relationship;build upon strengths   Social and Therapeutic Interventions (Depression) encourage socialization with peers;encourage effective boundaries with peers;encourage participation in therapeutic groups and milieu activities

## 2018-01-12 NOTE — PROGRESS NOTES
"Writer met with pt to discuss his interest in treatment. Provided him with paperwork for Rule 25 assessment. Pt stated, \"i'm not interested in doing this anymore, it's not going to do anything.\" Writer asked if pt might be more interested in completing paperwork when he is feeling better. Pt stated at this time no and that he would like to be discharged. Writer called to update Kyler that pt will no longer be needing a CD assessment.     "

## 2018-01-12 NOTE — PROGRESS NOTES
01/12/18 1400   Behavioral Health   Hallucinations denies / not responding to hallucinations   Thinking distractable;poor concentration   Orientation person: oriented;place: oriented;date: oriented;time: oriented   Memory baseline memory   Insight poor   Judgement impaired   Eye Contact at examiner   Affect blunted, flat   Mood depressed;hopeless   Physical Appearance/Attire attire appropriate to age and situation   Hygiene neglected grooming - unclean body, hair, teeth   Suicidality other (see comments)  (Unable to obtain information)   1. Wish to be Dead No   2. Non-Specific Active Suicidal Thoughts  No   Self Injury other (see comment)  (Unable to obtain information)   Activity isolative;withdrawn   Speech coherent;clear   Medication Sensitivity no observed side effects;no stated side effects   Psychomotor / Gait balanced;steady   Substance Withdrawal Interventions   Social and Therapeutic Interventions (Substance Withdrawal) encourage socialization with peers;encourage effective boundaries with peers;encourage participation in therapeutic groups and milieu activities   Behavioral Health Interventions   Depression maintain safety precautions;maintain safe secure environment   Social and Therapeutic Interventions (Depression) encourage socialization with peers;encourage participation in therapeutic groups and milieu activities;encourage effective boundaries with peers     Pt. Was Sleeping all shift. Pt. Did not shower or perform basic hygiene during shift. Pt. Only had a couple bites of breakfast and did not eat any lunch. Excessive sleep habits are concerning. Seems very down for the very little interaction he had with me.

## 2018-01-13 PROCEDURE — 12400007 ZZH R&B MH INTERMEDIATE UMMC

## 2018-01-13 PROCEDURE — 25000132 ZZH RX MED GY IP 250 OP 250 PS 637: Performed by: PSYCHIATRY & NEUROLOGY

## 2018-01-13 RX ADMIN — GABAPENTIN 300 MG: 300 CAPSULE ORAL at 22:49

## 2018-01-13 RX ADMIN — GABAPENTIN 300 MG: 300 CAPSULE ORAL at 15:01

## 2018-01-13 RX ADMIN — GABAPENTIN 300 MG: 300 CAPSULE ORAL at 09:26

## 2018-01-13 RX ADMIN — OLANZAPINE 15 MG: 7.5 TABLET, FILM COATED ORAL at 22:49

## 2018-01-13 RX ADMIN — TRAZODONE HYDROCHLORIDE 150 MG: 150 TABLET ORAL at 22:54

## 2018-01-13 NOTE — PROGRESS NOTES
Pt stated he wants to leave to go to the [employer] game on Sunday, but then changed his mind.  He now states he would like to return to [facility] for CD tx.  He was reclusive to his bedroom for the majority of the evening.  He reports passive suicidal thoughts with no intent to harm self, denies SIB urges/thoughts.  Affect appears blunted to flat.  He did not attend Community Meeting this evening.        01/12/18 9280   Behavioral Health   Hallucinations denies / not responding to hallucinations   Thinking distractable;poor concentration   Orientation person: oriented;place: oriented   Memory baseline memory   Insight poor   Judgement impaired   Eye Contact at examiner   Affect blunted, flat   Mood depressed   Physical Appearance/Attire appears stated age;attire appropriate to age and situation   Hygiene neglected grooming - unclean body, hair, teeth   Suicidality thoughts only   1. Wish to be Dead No   2. Non-Specific Active Suicidal Thoughts  No   Self Injury other (see comment)  (Currently denies active SIB urges/thoughts. )   Elopement Statements about wanting to leave  (Stated he wants to leave to go to the [employer] game on Sunday)   Activity isolative;withdrawn   Speech clear;coherent   Medication Sensitivity no stated side effects;no observed side effects   Psychomotor / Gait balanced;steady   Sleep/Rest/Relaxation   Day/Evening Time Hours napping;resting in bed   Number of hours napping 1 hours   Number of hours resting in bed 2 hours   Activities of Daily Living   Hygiene/Grooming handwashing;independent   Oral Hygiene independent   Dress street clothes;independent   Laundry with supervision   Room Organization independent   Activity   Activity Assistance Provided independent   Groups   Details Community Meeting  (Did not attend group.)   Behavioral Health Interventions   Depression maintain safe secure environment;assist patient in developing safety plan;assist patient in following safety plan;provide emotional  support;establish therapeutic relationship;assist with developing and utilizing healthy coping strategies;build upon strengths;assess patient response to medication;assess medication adherance;monitor need for prn medication   Social and Therapeutic Interventions (Depression) encourage socialization with peers;encourage participation in therapeutic groups and milieu activities

## 2018-01-14 PROCEDURE — 25000132 ZZH RX MED GY IP 250 OP 250 PS 637: Performed by: PSYCHIATRY & NEUROLOGY

## 2018-01-14 PROCEDURE — 12400007 ZZH R&B MH INTERMEDIATE UMMC

## 2018-01-14 RX ADMIN — GABAPENTIN 300 MG: 300 CAPSULE ORAL at 08:50

## 2018-01-14 RX ADMIN — OLANZAPINE 15 MG: 7.5 TABLET, FILM COATED ORAL at 21:59

## 2018-01-14 RX ADMIN — GABAPENTIN 600 MG: 300 CAPSULE ORAL at 21:59

## 2018-01-14 RX ADMIN — GABAPENTIN 300 MG: 300 CAPSULE ORAL at 22:00

## 2018-01-14 ASSESSMENT — ACTIVITIES OF DAILY LIVING (ADL)
DRESS: INDEPENDENT;SCRUBS (BEHAVIORAL HEALTH)
ORAL_HYGIENE: INDEPENDENT
GROOMING: INDEPENDENT
LAUNDRY: WITH SUPERVISION
ORAL_HYGIENE: INDEPENDENT
LAUNDRY: WITH SUPERVISION
DRESS: INDEPENDENT
GROOMING: INDEPENDENT;SHOWER

## 2018-01-14 NOTE — PROGRESS NOTES
Pt isolative all day.       01/14/18 1510   Behavioral Health   Hallucinations denies / not responding to hallucinations   Thinking poor concentration   Orientation person: oriented;place: oriented   Memory short term   Insight poor   Judgement impaired   Eye Contact into space   Affect blunted, flat;tense   Mood anxious;depressed   Physical Appearance/Attire disheveled   Hygiene neglected grooming - unclean body, hair, teeth   Suicidality other (see comments)  (denies)   1. Wish to be Dead No   2. Non-Specific Active Suicidal Thoughts  No   Self Injury other (see comment)  (denies)   Elopement (nothing stated or observed)   Activity isolative;withdrawn;other (see comment)  (resting in bed most of the shift, ate lunch, no breakfast)   Speech clear;coherent   Medication Sensitivity no stated side effects;no observed side effects   Psychomotor / Gait balanced;steady   Activities of Daily Living   Hygiene/Grooming independent   Oral Hygiene independent   Dress independent   Laundry with supervision   Room Organization independent   Behavioral Health Interventions   Depression maintain safety precautions;maintain safe secure environment;provide emotional support;establish therapeutic relationship;assist with developing and utilizing healthy coping strategies;build upon strengths   Social and Therapeutic Interventions (Depression) encourage socialization with peers;encourage effective boundaries with peers;encourage participation in therapeutic groups and milieu activities

## 2018-01-14 NOTE — PLAN OF CARE
Problem: Depressive Symptoms  Goal: Depressive Symptoms  Signs and symptoms of listed problems will be absent or manageable.   Outcome: No Change    Saeed has spent the majority of his time over the weekend in bed. He comes out for meals, was cooperative with moving in with a roommate so we could free up a double room to be a private.He looks disheveled, flat affect. Since sleeping, did not wake for 1:1. Did request trazadone 150 mg PRN.

## 2018-01-15 PROCEDURE — 99233 SBSQ HOSP IP/OBS HIGH 50: CPT | Performed by: PSYCHIATRY & NEUROLOGY

## 2018-01-15 PROCEDURE — 25000132 ZZH RX MED GY IP 250 OP 250 PS 637: Performed by: PSYCHIATRY & NEUROLOGY

## 2018-01-15 PROCEDURE — 12400007 ZZH R&B MH INTERMEDIATE UMMC

## 2018-01-15 PROCEDURE — 99207 ZZC CDG-MDM COMPONENT: MEETS MODERATE - UP CODED: CPT | Performed by: PSYCHIATRY & NEUROLOGY

## 2018-01-15 RX ORDER — TRIFLUOPERAZINE HYDROCHLORIDE 2 MG/1
4-8 TABLET, FILM COATED ORAL 3 TIMES DAILY PRN
Status: DISCONTINUED | OUTPATIENT
Start: 2018-01-15 | End: 2018-01-19 | Stop reason: HOSPADM

## 2018-01-15 RX ADMIN — GABAPENTIN 300 MG: 300 CAPSULE ORAL at 08:48

## 2018-01-15 RX ADMIN — GABAPENTIN 300 MG: 300 CAPSULE ORAL at 16:24

## 2018-01-15 RX ADMIN — TRIFLUOPERAZINE HYDROCHLORIDE 8 MG: 2 TABLET, FILM COATED ORAL at 16:27

## 2018-01-15 RX ADMIN — GABAPENTIN 300 MG: 300 CAPSULE ORAL at 20:12

## 2018-01-15 RX ADMIN — TRAZODONE HYDROCHLORIDE 150 MG: 150 TABLET ORAL at 20:12

## 2018-01-15 RX ADMIN — OLANZAPINE 15 MG: 7.5 TABLET, FILM COATED ORAL at 20:12

## 2018-01-15 ASSESSMENT — ACTIVITIES OF DAILY LIVING (ADL)
ORAL_HYGIENE: INDEPENDENT
DRESS: INDEPENDENT
GROOMING: INDEPENDENT
LAUNDRY: WITH SUPERVISION

## 2018-01-15 NOTE — PROGRESS NOTES
Natasha two pm dose of Gabapentin 300 mg was missed. That dose was made up with his Gabapentin 300 mg, for a total HS dose of Gabapentin 600 mg.

## 2018-01-15 NOTE — PROGRESS NOTES
"[facility], [place]   Psychiatric Progress Note         Interim History and Subjective Reports:   The patient's care was discussed with the treatment team during the daily team meeting.  Staff reports: no acute issues    Mood is slightly better in comparison to last exam.  Depression severity scale 0-10 (10=most severe):  Today: 7    Mood remains depressed; ranging from moderate to severe.  Mostly influenced by intrusive thoughts as previously mentioned.  No intention on acting on them.    Suicidal thoughts are present.  No active plan or intent reported.  He is able to contract for safety.  Denied homicidal thoughts.    He denied auditory and visual hallucinations however did not reference his conscience having a lot of presents.    Energy is low, appetite is low, concentration is poor.  Tolerating medications without side effects.           Scheduled Medications:       OLANZapine  15 mg Oral At Bedtime     gabapentin  300 mg Oral TID          Allergies:    No Known Allergies       Labs:   No results found for this or any previous visit (from the past 24 hour(s)).       Vitals:   /89  Pulse 98  Temp 96.9  F (36.1  C)  Resp 16  Ht 1.727 m (5' 8\")  Wt 77.6 kg (171 lb)  SpO2 96%  BMI 26 kg/m2  Weight: 171 lbs 0 oz          Height: 5' 8\"           Body mass index is 26 kg/(m^2).        Mental Status Examination:   Appearance: poorly groomed and fatigued  Attitude:  cooperative  Eye Contact:  poor   Mood:  depressed  Affect:  intensity is blunted  Speech:  clear, coherent  Psychomotor Behavior:  no evidence of tardive dyskinesia, dystonia, or tics  Throught Process:  linear  Associations:  no loose associations  Thought Content:  no evidence of psychotic thought and active suicidal ideation present  Insight:  fair  Judgement:  intact  Oriented to:  time, person, and place  Attention Span and Concentration:  fair  Recent and Remote Memory:  intact         DIagnoses:     1.  " Unspecified depressive disorder,         Rule out a substance-induced mood disorder (amphetamines).          Rule out a primary mood disorder (MDD versus bipolar disorder),         Rule out obsessive-compulsive disorder.   2.  Stimulant use disorder, amphetamine type substance, severe.   3.  Cannabis use disorder, mild         Plan:   1. Biological treatments:  --Increase Zyprexa for better effectiveness; tolerating the current dose well however frequently utilizing as needed medications for breakthrough symptoms  --His current presentation may be complicated by amphetamine withdrawal.  We will continue to assess as withdrawal symptoms diminished over the next few days.    2. Psychosocial treatments:   --addressed with SW consult and groups  --Initiated psychology consult for neuropsychological testing for diagnostic clarification; the patient is agreeable.  --Obtain a chemical health assessment; he is agreeable.     3. Dispo:  --Attempt to transition directly into a residential CD treatment facility

## 2018-01-15 NOTE — PROGRESS NOTES
Patient did not have a goal for the evening, with no current plans for discharge. Patient was calm this evening, social and in the milieu for the [employer] game, however did not attend groups and was isolative/withdrawn for the majority of the evening. Patient also napped/sleep often this evening. Patient reported feeling depressed (7/10), sad, and confused. Patient denies any hallucinations. Patient expressed suicidal ideation with thoughts and plan as well as self injurious feelings (thoughts only). ADL's are independent with no nutrition concerns. Patient expressed concerns over medications not working.        01/14/18 2026   Behavioral Health   Hallucinations denies / not responding to hallucinations   Thinking poor concentration;distractable   Orientation time: oriented;date: oriented;place: oriented;person: oriented   Memory baseline memory   Insight poor   Judgement impaired   Eye Contact out of corner of eyes;at floor   Affect blunted, flat;sad;tense   Mood mood is calm;depressed   Physical Appearance/Attire disheveled   Hygiene other (see comment)  (Showered this evening)   Suicidality thoughts and plan   Self Injury thoughts only   Elopement (Does not appear to be a risk )   Activity isolative;withdrawn;other (see comment)  (In milieu for [employer] game )   Speech coherent;clear   Medication Sensitivity no observed side effects;no stated side effects   Psychomotor / Gait steady;balanced

## 2018-01-15 NOTE — PROGRESS NOTES
01/15/18 1300   Behavioral Health   Hallucinations denies / not responding to hallucinations   Thinking poor concentration   Orientation person: oriented;place: oriented;date: oriented;time: oriented   Memory baseline memory   Insight poor   Judgement impaired   Eye Contact at examiner   Affect full range affect   Mood anxious;depressed   Physical Appearance/Attire attire appropriate to age and situation   Hygiene well groomed   Suicidality thoughts only   1. Wish to be Dead No   2. Non-Specific Active Suicidal Thoughts  No   Elopement (Does not appear to be at risk.)   Activity other (see comment)  (Out in milieu in the afternoon.)   Speech clear;coherent   Medication Sensitivity no stated side effects;no observed side effects   Psychomotor / Gait balanced;steady

## 2018-01-15 NOTE — PROGRESS NOTES
Requested in house rule 25 assessment for patient.  Paperwork was given to patient to start working on.

## 2018-01-16 PROCEDURE — 12400007 ZZH R&B MH INTERMEDIATE UMMC

## 2018-01-16 PROCEDURE — 25000132 ZZH RX MED GY IP 250 OP 250 PS 637: Performed by: PSYCHIATRY & NEUROLOGY

## 2018-01-16 PROCEDURE — 99232 SBSQ HOSP IP/OBS MODERATE 35: CPT | Performed by: PSYCHIATRY & NEUROLOGY

## 2018-01-16 RX ORDER — OLANZAPINE 20 MG/1
20 TABLET ORAL AT BEDTIME
Status: DISCONTINUED | OUTPATIENT
Start: 2018-01-16 | End: 2018-01-19 | Stop reason: HOSPADM

## 2018-01-16 RX ADMIN — GABAPENTIN 300 MG: 300 CAPSULE ORAL at 13:48

## 2018-01-16 RX ADMIN — GABAPENTIN 300 MG: 300 CAPSULE ORAL at 22:11

## 2018-01-16 RX ADMIN — OLANZAPINE 20 MG: 20 TABLET, FILM COATED ORAL at 22:11

## 2018-01-16 RX ADMIN — TRIFLUOPERAZINE HYDROCHLORIDE 4 MG: 2 TABLET, FILM COATED ORAL at 08:55

## 2018-01-16 RX ADMIN — GABAPENTIN 300 MG: 300 CAPSULE ORAL at 08:55

## 2018-01-16 RX ADMIN — TRAZODONE HYDROCHLORIDE 150 MG: 150 TABLET ORAL at 22:11

## 2018-01-16 RX ADMIN — TRIFLUOPERAZINE HYDROCHLORIDE 8 MG: 2 TABLET, FILM COATED ORAL at 13:48

## 2018-01-16 RX ADMIN — TRIFLUOPERAZINE HYDROCHLORIDE 8 MG: 2 TABLET, FILM COATED ORAL at 22:11

## 2018-01-16 ASSESSMENT — ACTIVITIES OF DAILY LIVING (ADL)
DRESS: INDEPENDENT
ORAL_HYGIENE: INDEPENDENT
GROOMING: INDEPENDENT
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
## 2018-01-16 NOTE — PLAN OF CARE
Problem: Patient Care Overview  Goal: Team Discussion  Team Plan:   Outcome: Therapy, progress toward functional goals is gradual  BEHAVIORAL TEAM DISCUSSION    Participants: Adilson Pang MD; DONG-GLADYS Gracia, [facility]; Nba Nguyễn, ABY; Janette Jang OT; Tony Gudino RN  Progress: Pt is currently on day 6 of hospitalization and remains due to continued depression, suicidal ideation, and intrusive thoughts that impact his ability to function appropriately out in the community.  Pt has been started and tolerating zyprexa and gabapentin.  Stelazine was added as a prn, which are targeting mood symptoms and intrusive thoughts.  Pt is not participating in offered groups, however is visible in the milieu more than when he was admitted.  He had a rule 25 assessment completed this morning.  He was referred to [facility].  [facility] approved him for residential treatment.  Currently awaiting approval from [facility].  Neuropsychological consult for testing.  Continued Stay Criteria/Rationale: remains due to continued depression, suicidal ideation, and intrusive thoughts that impact his ability to function appropriately out in the community.  Medical/Physical: no acute issues.  Precautions:   Behavioral Orders   Procedures     Code 1 - Restrict to Unit     [facility]     MMPI 2     Routine Programming     As clinically indicated     Sexual precautions     Intrusive thoughts of sexual assault toward others. No known history of acting on these thoughts.     Status 15     Every 15 minutes.     Suicide precautions     Plan:   1. Biological treatments:  --Increase Zyprexa for better effectiveness; tolerating the current dose well however frequently utilizing as needed medications for breakthrough symptoms  --His current presentation may be complicated by amphetamine withdrawal.  We will continue to assess as withdrawal symptoms diminished over the next few days.     2. Psychosocial treatments:   --addressed with SW consult and  groups  --Initiated psychology consult for neuropsychological testing for diagnostic clarification; the patient is agreeable.  --Obtain a chemical health assessment; he is agreeable.      3. Dispo:  --Attempt to transition directly into a residential CD treatment facility        Rationale for change in precautions or plan:no changes.

## 2018-01-16 NOTE — PROGRESS NOTES
01/15/18 2000   Behavioral Health   Hallucinations denies / not responding to hallucinations   Thinking poor concentration   Orientation person: oriented;place: oriented;date: oriented;time: oriented   Memory baseline memory   Insight poor   Judgement impaired   Eye Contact at examiner   Affect full range affect   Mood depressed;anxious   Physical Appearance/Attire attire appropriate to age and situation   Suicidality thoughts only   1. Wish to be Dead No   2. Non-Specific Active Suicidal Thoughts  No   Self Injury other (see comment)  (denies)   Elopement (No Value)   Activity other (see comment)  (social with peers, sleeping / napping)   Speech clear;coherent   Medication Sensitivity no stated side effects;no observed side effects   Psychomotor / Gait balanced;steady   Activities of Daily Living   Hygiene/Grooming independent   Oral Hygiene independent   Dress independent   Laundry with supervision   Room Organization independent

## 2018-01-16 NOTE — PROGRESS NOTES
01/16/18 1600   General Information   Has Not Attended OT as of: 01/16/18   Pt has not attended any OT programming to date. Staff report that pt is rarely seen out of room.

## 2018-01-16 NOTE — PROGRESS NOTES
"Rule 25 Assessment  Background Information   1. Date of Assessment Request  2. Date of Assessment  1/16/2018 3. Date Service Authorized     4.   Angelica Driver LPC 5.  Phone Number   [phone] 6. Referent  N/A 7. Assessment Site  [facility] 30NR     8. Client Name   Saeed Mccullough 9. Date of Birth  1985 Age  32 year old 10. Gender  male  11. PMI/ Insurance No.  [id number]   12. Client's Primary Language:  English 13. Do you require special accommodations, such as an  or assistance with written material? No   14. Current Address: [place] [address]  [facility] [address]   15. Client Phone Numbers: [phone] (home)      16. Tell me what has happened to bring you here today.    Patient reports he is in the hospital due to suicidal ideation.    ED note on 1/9/18: \"Saeed Mccullough is a 32 year old male who presents to the emergency department via EMS with concern for suicide ideations.  The patient was found on [address] attempting to jump.  EMS was subsequently called and patient was transported here to the emergency department.  Patient states he has been having increasing suicide ideations for the past day.  He states that he took 9 of his mother's methylphenidate pills at noon today.  He states that he was doing this to try to \"amp myself up\" in order to cut his wrists.  He denies any other ingestion.  He reports a history of depression but does not have any outpatient providers and does not take any antidepressants.  He reports ongoing methamphetamine abuse.  He states he last snorted meth yesterday.  He denies any hallucinations.  He denies alcohol use.  Patient denies any symptoms currently.  He denies any chest pain or dyspnea.  No abdominal pain.  No nausea or vomiting.  He denies any recent illness.  He denies any medical concerns.\"    17. Have you had other rule 25 assessments? Yes. When, Where, and What circumstances: Two months ago at a detox facility in [place], " 4-5 months ago at [facility] I - Acute Intoxication /Withdrawal Potential   1. Chemical use most recent 12 months outside a facility and other significant use history (client self-report)              X = Primary Drug Used   Age of First Use Most Recent Pattern of Use and Duration   Need enough information to show pattern (both frequency and amounts) and to show tolerance for each chemical that has a diagnosis   Date of last use and time, if needed   Withdrawal Potential? Requiring special care Method of use  (oral, smoked, snort, IV, etc)     Alcohol 18    Dinks once a month, 10 shots   3 weeks ago   No oral    x Marijuana/  Hashish 18 Twice a month, a couple hits 1/9/18 No  smoke      Cocaine/Crack 27  1 time only 5 years ago  No  snort    X Meth/  Amphetamines 27 using daily (sometimes couple times/day) for past 5 yrs. 5-10 hits 1/9/18 Yes Smoke snort      Heroin N/A             Other Opiates/  Synthetics N/A             Inhalants N/A             Benzodiazepines N/A             Hallucinogens N/A             Barbiturates/  Sedatives/  Hypnotics N/A             Over-the-Counter Drugs N/A             Other N/A             Nicotine N/A             2. Do you use greater amounts of alcohol/other drugs to feel intoxicated or achieve the desired effect? yes.  Or use the same amount and get less of an effect? no (DSM) Example: Increase in amounts and frequency of use.    3A. Have you ever been to detox? yes    3B. When was the first time? 10 years ago    3C. How many times since then? twice    3D. Date of most recent detox: 2 months ago    4.  Withdrawal symptoms: Have you had any of the following withdrawal symptoms?  Past 12 months Recent (past 30 days)   Unable to Sleep  Agitation  Fatigue / Extremely Tired  Sad / Depressed Feeling  Irritability  Sensitivity to Noise  High Blood Pressure  Hallucinations  Psychosis Unable to Sleep  Agitation  Fatigue / Extremely Tired  Sad / Depressed Feeling  Vivid /  Unpleasant Dreams  Irritability  Sensitivity to Noise  High Blood Pressure  Hallucinations  Unable to Eat  Psychosis  Anxiety / Worried     's Visual Observations and Symptoms: Alert and orientated x4 with mild withdrawal symptomology.     Based on the above information, is withdrawal likely to require attention as part of treatment participation?  No.    Dimension I Ratings   Acute intoxication/Withdrawal potential - The placing authority must use the criteria in Dimension I to determine a client s acute intoxication and withdrawal potential.    RISK DESCRIPTIONS - Severity ratin Client displays full functioning with good ability to tolerate and cope with withdrawal discomfort. No signs or symptoms of intoxication or withdrawal or resolving signs or symptoms.    REASONS SEVERITY WAS ASSIGNED (What about the amount of the person s use and date of most recent use and history of withdrawal problems suggests the potential of withdrawal symptoms requiring professional assistance? )    Patient displays no withdrawal symptomology at this time.  The patient's withdrawal symptomology was identified, managed and addressed by IP  Medical Team. Pt reports that his last use of meth was on 20. Pt was given a UA at time of ER admit and the UA was POS for amphetamines and cannabinoids.       DIMENSION II - Biomedical Complications and Conditions   1. Do you have any current health/medical conditions?(Include any infectious diseases, allergies, or chronic or acute pain, history of chronic conditions) No  Past Medical History:   Diagnosis Date     Schizophrenia (H)        2. Do you have a health care provider? When was your most recent appointment? What concerns were identified?     Patient is unsure if he has a provider and has not seen a provider in a long time    3. If indicated by answers to items 1 or 2: How do you deal with these concerns? Is that working for you? If you are not receiving care for this  problem, why not?      NA    4A. List current medication(s) including over-the-counter or herbal supplements--including pain management:     Prior to Admission medications    Medication Sig Start Date End Date Taking? Authorizing Provider   ULICES CADET    Yes Reported, Patient     Current Facility-Administered Medications   Medication     trifluoperazine (STELAZINE) tablet 4-8 mg     OLANZapine (zyPREXA) tablet 15 mg     gabapentin (NEURONTIN) capsule 300 mg     hydrOXYzine (ATARAX) tablet 25-50 mg     OLANZapine zydis (zyPREXA) ODT tab 10 mg     acetaminophen (TYLENOL) tablet 650 mg     traZODone (DESYREL) tablet 150 mg       4B. Do you follow current medical recommendations/take medications as prescribed?     yes    4C. When did you last take your medication(s)? 2018    5. Has a health care provider/healer ever recommended that you reduce or quit alcohol/drug use? yes    6. Are you pregnant? No    7. Have you had any injuries, assaults/violence towards you, accidents, health related issues, overdose(s) or hospitalizations related to your use of alcohol or other drugs: No    8. Do you have any specific physical needs/accommodations? No    Dimension II Ratings   Biomedical Conditions and Complications - The placing authority must use the criteria in Dimension II to determine a client s biomedical conditions and complications.   RISK DESCRIPTIONS - Severity ratin Client displays full functioning with good ability to cope with physical discomfort.    REASONS SEVERITY WAS ASSIGNED (What physical/medical problems does this person have that would inhibit his or her ability to participate in treatment? What issues does he or she have that require assistance to address?)    Patient denies having any chronic biomedical conditions that would interfere with treatment or any recovery skills training/workshop. Pt does endorse having the following medical conditions; none. Pt reports taking the following medications  at this time;    Current Facility-Administered Medications   Medication     trifluoperazine (STELAZINE) tablet 4-8 mg     OLANZapine (zyPREXA) tablet 15 mg     gabapentin (NEURONTIN) capsule 300 mg     hydrOXYzine (ATARAX) tablet 25-50 mg     OLANZapine zydis (zyPREXA) ODT tab 10 mg     acetaminophen (TYLENOL) tablet 650 mg     traZODone (DESYREL) tablet 150 mg     At the time of detox admission the patients BP was 138/82 and Pulse was 111 BPM. Ptdenies having pain at this time. Pain Rating Scale.Pt denies having any consumption of nicotine products at this time.       DIMENSION III - Emotional, Behavioral, Cognitive Conditions and Complications   1. (Optional) Tell me what it was like growing up in your family. (substance use, mental health, discipline, abuse, support)     Raised by: Mom  Siblings: 1 and patient reports he was the second born.  Family CD History: None  Family MH History: None  Abuse: Pt denies a history of abuse while growing up.   Supported?: Pt reports that they felt supported 100% of the time while growing up.  Forms of punishment growing up?: None     2. When was the last time that you had significant problems...  A. with feeling very trapped, lonely, sad, blue, depressed or hopeless  about the future? Past Month; feels this way everyday and uses because of these feelings    B. with sleep trouble, such as bad dreams, sleeping restlessly, or falling  asleep during the day? Past Month; daily issue, using partially creates the problem    C. with feeling very anxious, nervous, tense, scared, panicked, or like  something bad was going to happen? Past Month; feels this way daily, related to use    D. with becoming very distressed and upset when something reminded  you of the past? Past Month; daily feeling and is related to use    E. with thinking about ending your life or committing suicide? Past Month because of having bad thoughts of hurting people    3. When was the last time that you did the  following things two or more times?  A. Lied or conned to get things you wanted or to avoid having to do  something? Never    B. Had a hard time paying attention at school, work, or home? Past Month. Patient reports this has been a problem for the past year; related to use    C. Had a hard time listening to instructions at school, work, or home? Past Month: problem for the past year, related to use; feels this way even when not using    D. Were a bully or threatened other people? Never    E. Started physical fights with other people? Never      Note: These questions are from the Global Appraisal of Individual Needs--Short Screener. Any item marked  past month  or  2 to 12 months ago  will be scored with a severity rating of at least 2.     For each item that has occurred in the past month or past year ask follow up questions to determine how often the person has felt this way or has the behavior occurred? How recently? How has it affected their daily living? And, whether they were using or in withdrawal at the time?      4A. If the person has answered item 2E with  in the past year  or  the past month , ask about frequency and history of suicide in the family or someone close and whether they were under the influence.     Any history of suicide in your family? Or someone close to you? No    4B. If the person answered item 2E  in the past month  ask about  intent, plan, means and access and any other follow-up information  to determine imminent risk. Document any actions taken to intervene  on any identified imminent risk.       Patient was admitted due to being found [place] bridge contemplating suicide.    5A. Have you ever been diagnosed with a mental health problem?  yes    5B. Are you receiving care for any mental health issues? If yes, what is the focus of that care or treatment?  Are you satisfied with the service? Most recent appointment?  How has it been helpful?      Yes, patient is currently hospitalized and  reports it is not helpful.     6. Have you been prescribed medications for emotional/psychological problems? Yes.  6B. Current mental health medication(s) If these medications are listed for Dimension II, reference item II-5.   6C. Are you taking your medications as instructed?  yes.    7. Does your MH provider know about your use? Yes.  7B. What does he or she have to say about it?(DSM) No idea.    8A. Have you ever been verbally, emotionally, physically or sexually abused?  No     Follow up questions to learn current risk, continuing emotional impact.  NA    8B. Have you received counseling for abuse?  N/A    9. Have you ever experienced or been part of a group that experienced community violence, historical trauma, rape or assault? No    10A. [place]: No    11. Do you have problems with any of the following things in your daily life?  Problem Solving, Concentration and Reading, writing, calculating    Note: If the person has any of the above problems, follow up with items 12, 13, and 14. If none of the issues in item 11 are a problem for the person, skip to item 15.      12. Have you been diagnosed with traumatic brain injury or Alzheimer s?  no     13. If the answer to #12 is no, ask the following questions:    Have you ever hit your head or been hit on the head? no     Were you ever seen in the Emergency Room, hospital or by a doctor because of an injury to your head? no    Have you had any significant illness that affected your brain (brain tumor, meningitis, West Nile Virus, stroke or seizure, heart attack, near drowning or near suffocation)?  no     14. If the answer to #12 is yes, ask if any of the problems identified in #11 occurred since the head injury or loss of oxygen. NA    15A. Highest grade of school completed:     High school graduate/GED    15B. Do you have a learning disability? no     15C. Did you ever have tutoring in Math or English? yes     15D. Have you ever been diagnosed with Fetal Alcohol  Effects or Fetal Alcohol Syndrome? no     16. If yes to item 15 B, C, or D: How has this affected your use or been affected by your use? No    Dimension III Ratings   Emotional/Behavioral/Cognitive - The placing authority must use the criteria in Dimension III to determine a client s emotional, behavioral, and cognitive conditions and complications.   RISK DESCRIPTIONS - Severity ratin Client has difficulty with impulse control and lacks coping skills. Client has thoughts of suicide or harm to others without means; however, the thoughts may interfere with participation in some treatment activities. Client has difficulty functioning in significant life areas. Client has moderate symptoms of emotional, behavioral, or cognitive problems. Client is able to participate in most treatment activities.    REASONS SEVERITY WAS ASSIGNED - What current issues might with thinking, feelings or behavior pose barriers to participation in a treatment program? What coping skills or other assets does the person have to offset those issues? Are these problems that can be initially accommodated by a treatment provider? If not, what specialized skills or attributes must a provider have?    The patient reports having mental health diagnosis of schizophrenia, bipolar, drug induced psychosis. Pt reports taking the following medications for MH; Zyprexa and Gabapentin. Pt reports that his childhood was good and felt supported 100% of the time while growing up. Pt reports having one sibling and reports that he was the second born. Pt rdenies a history of abuse. Pt lacks sober coping skills and impulse control. Pt lacks emotional and stress management skills. Pt denies SIB/SA/HI/HA at this time.          DIMENSION IV - Readiness for Change   1. You ve told me what brought you here today. (first section) What do you think the problem really is?     Depression and drug use    2. Tell me how things are going. Ask enough questions to determine  whether the person has use related problems or assets that can be built upon in the following areas: Family/friends/relationships; Legal; Financial; Emotional; Educational; Recreational/ leisure; Vocational/employment; Living arrangements (DSM)      Relationships: Not good  Legal: None  Financial: No income  Emotional: Not good  Education: Sufficient  Leisure: too much  Employment: Unemployed   Living Arrangements: Lives with mom      3. What activities have you engaged in when using alcohol/other drugs that could be hazardous to you or others (i.e. driving a car/motorcycle/boat, operating machinery, unsafe sex, sharing needles for drugs or tattoos, etc     NA    4. How much time do you spend getting, using or getting over using alcohol or drugs? (DSM)     Most of my time    5. Reasons for drinking/drug use (Use the space below to record answers. It may not be necessary to ask each item.)  Like the feeling Yes   Trying to forget problems Yes   To cope with stress Yes   To relieve physical pain No   To cope with anxiety Yes   To cope with depression Yes   To relax or unwind No   Makes it easier to talk with people No   Partner encourages use No   Most friends drink or use No   To cope with family problems Yes   Afraid of withdrawal symptoms/to feel better No   Other (specify)  N/A     A. What concerns other people about your alcohol or drug use/Has anyone told you that you use too much? What did they say? (DSM)     I don't know    B. What did you think about that/ do you think you have a problem with alcohol or drug use?     Yes    6. What changes are you willing to make? What substance are you willing to stop using? How are you going to do that? Have you tried that before? What interfered with your success with that goal?      Stop using drugs     7. What would be helpful to you in making this change?     Going to treatment    Dimension IV Ratings   Readiness for Change - The placing authority must use the criteria  "in Dimension IV to determine a client s readiness for change.   RISK DESCRIPTIONS - Severity rating: 3 Client displays inconsistent compliance, minimal awareness of either the client s addiction or mental disorder, and is minimally cooperative.    REASONS SEVERITY WAS ASSIGNED - (What information did the person provide that supports your assessment of his or her readiness to change? How aware is the person of problems caused by continued use? How willing is she or he to make changes? What does the person feel would be helpful? What has the person been able to do without help?)      Patient displays verbal compliance and motivation but lacks consistent behaviors and follow-through. Pt has continued to use despite No more friends, unemployment. Pt appears to be in the \"contemplation\" Stage within the Stages of Change Model.        DIMENSION V - Relapse, Continued Use, and Continued Problem Potential   1. In what ways have you tried to control, cut-down or quit your use? If you have had periods of sobriety, how did you accomplish that? What was helpful? What happened to prevent you from continuing your sobriety? (DSM)      I haven't tried to cut down or quit.  My longest peroid of sobriety has been 3 months when I was working.    2. Have you experienced cravings? If yes, ask follow up questions to determine if the person recognizes triggers and if the person has had any success in dealing with them.     No    3. Have you been treated for alcohol/other drug abuse/dependence? Yes.  3B. Number of times(lifetime) (over what period) once.  3C. Number of times completed treatment (lifetime) None.  3D. During the past three years have you participated in outpatient and/or residential?  Yes.  3E. When and where? [facility] November 2017.   3F. What was helpful? What was not? Being there and away from drugs was helpful.    4. Support group participation: Have you/do you attend support group meetings to reduce/stop your alcohol/drug " use? How recently? What was your experience? Are you willing to restart? If the person has not participated, is he or she willing?     No    5. What would assist you in staying sober/straight?     Treatment    Dimension V Ratings   Relapse/Continued Use/Continued problem potential - The placing authority must use the criteria in Dimension V to determine a client s relapse, continued use, and continued problem potential.   RISK DESCRIPTIONS - Severity ratin No awareness of the negative impact of mental health problems or substance abuse. No coping skills to arrest mental health or addiction illnesses, or prevent relapse.    REASONS SEVERITY WAS ASSIGNED - (What information did the person provide that indicates his or her understanding of relapse issues? What about the person s experience indicates how prone he or she is to relapse? What coping skills does the person have that decrease relapse potential?)     Patient reports having been involved in one past treatments (completed none), 3 past detox admissions, and no past 12-Step support group participation. Pt reports having minimal sober time (3 months) and has tried to quit using in the past but relapsed. Pt lacks insight into his personal relapse process along with early warning signs and triggers. Pt lacks impulse control, sober coping skills and long-term sober maintenance skills. Pt lacks insight into the effects his use has had on his physical and mental health. Pt is at a high risk for relapse/continued use.       DIMENSION VI - Recovery Environment   1. Are you employed/attending school? Tell me about that.     I am currently unemployed  and I am not attending school at this time.     2A. Describe a typical day; evening for you. Work, school, social, leisure, volunteer, spiritual practices. Include time spent obtaining, using, recovering from drugs or alcohol. (DSM)     I haven't been doing much of anything as of late and spend most of the day using or  with things related to my use.       2B. How often do you spend more time than you planned using or use more than you planned? (DSM)     I usually use ore than I planned to use.     3. How important is using to your social connections? Do many of your family or friends use?     Not important at all.        4A. Are you currently in a significant relationship? No    4C. Sexual Orientation: Heterosexual    5A. Who do you live with?  Mom and brother    5B. Tell me about their alcohol/drug use and mental health issues. None    5C. Are you concerned for your safety there? no.    5D. Are you concerned about the safety of anyone else who lives with you? no.    6A. Do you have children who live with you? No    6B. Do you have children who do not live with you? No    7A. Who supports you in making changes in your alcohol or drug use? What are they willing to do to support you? Who is upset or angry about you making changes in your alcohol or drug use? How big a problem is this for you?      My family is supportive. I am sure some would help if I needed something and if they knew I was working hard on my sobriety.     7B. This table is provided to record information about the person s relationships and available support It is not necessary to ask each item; only to get a comprehensive picture of their support system.  How often can you count on the following people when you need someone?   Partner / Spouse N/A   Parent(s)/Aunt(s)/Uncle(s)/Grandparents Always supportive   Sibling(s)/Cousin(s) Always supportive   Child(hannah) N/A   Other relative(s) N/A   Friend(s)/neighbor(s) N/A   Child(hannah) s father(s)/mother(s) N/A   Support group member(s) N/A   Community of vaibhav members N/A   /counselor/therapist/healer N/A   Other (specify) N/A     8A. What is your current living situation?     I am currently living with my mom and brother       8B. What is your long term plan for where you will be living?     I would like to  continue to live with mom.    8C. Tell me about your living environment/neighborhood? Ask enough follow up questions to determine safety, criminal activity, availability of alcohol and drugs, supportive or antagonistic to the person making changes.      Everything is safe and I have no concerns.     9. Criminal justice history: Gather current/recent history and any significant history related to substance use--Arrests? Convictions? Circumstances? Alcohol or drug involvement? Sentences? Still on probation or parole? Expectations of the court? Current court order? Any sex offenses - lifetime? What level? (DSM)    NA    10. What obstacles exist to participating in treatment? (Time off work, childcare, funding, transportation, pending long term time, living situation)     None    Dimension VI Ratings   Recovery environment - The placing authority must use the criteria in Dimension VI to determine a client s recovery environment.   RISK DESCRIPTIONS - Severity rating: 3 Client is not engaged in structured, meaningful activity and the client s peers, family, significant other, and living environment are unsupportive, or there is significant criminal justice system involvement.    REASONS SEVERITY WAS ASSIGNED - (What support does the person have for making changes? What structure/stability does the person have in his or her daily life that will increase the likelihood that changes can be sustained? What problems exist in the person s environment that will jeopardize getting/staying clean and sober?)     Patient reports that his current living situation is supportive towards his recovery. Pt reports that he is currently living with mom and brother. Pt reports that he lacks a daily structure and meaningful activities. Pt reports that he is currently unemployed and is not attending school at this time. Pt reports fracturing relationships with family and friends due to his use. Pt lacks a sober support network. Pt denies having  any legal involvement of any kind.        Client Choice/Exceptions   Would you like services specific to language, age, gender, culture, Oriental orthodox preference, race, ethnicity, sexual orientation or disability?  No    What particular treatment choices and options would you like to have? Residential or IOP with lodging treatment      Do you have a preference for a particular treatment program? [facility].    Criteria for Diagnosis     Criteria for Diagnosis  DSM-5 Criteria for Substance Use Disorder  Instructions: Determine whether the client currently meets the criteria for Substance Use Disorder using the diagnostic criteria in the DSM-V pp.[phone]. Current means during the most recent 12 months outside a facility that controls access to substances    Category of Substance Severity (ICD-10 Code / DSM 5 Code)     Alcohol Use Disorder NA   Cannabis Use Disorder Mild  (F12.10) (305.20)   Hallucinogen Use Disorder NA   Inhalant Use Disorder NA   Opioid Use Disorder NA   Sedative, Hypnotic, or Anxiolytic Use Disorder NA   Stimulant Related Disorder Severe   (F15.20) (304.40) Amphetamine type substance   Tobacco Use Disorder NA   Other (or unknown) Substance Use Disorder NA     Collateral Contact Summary   Number of contacts made: 1    Contact with referring person:  N/A.    If court related records were reviewed, summarize here: N/A    Information from collateral contacts supported/largely agreed with information from the client and associated risk ratings.    Rule 25 Assessment Summary and Plan   's Recommendation    1)  Complete a residential based/IOP W/ lodging or similar treatment program similar to [facility].   2)  Abstain from all mood-altering chemicals unless prescribed by a licensed provider.   3)  Attend weekly 12-step support group meetings.     4)  Actively work with a male sponsor or  through [facility] [employer] ([phone]).   5)  Follow all the recommendations of your  "treatment/medical providers.  6)  Remain law abiding   7)  Patient may benefit from obtaining a full mental health evaluation.  8)  Patient may benefit from 1:1 psychotherapy due to ongong depression         Collateral Contacts     Name:    Dr. Adilson Pang   Relationship:    Unit Physician   Phone Number:    [phone] Releases:    NA     INITIAL PSYCHIATRIC HISTORY AND PHYSICAL        DATE OF ASSESSMENT:  01/10/2018       IDENTIFYING INFORMATION:  The patient is a 32-year-old single  male currently residing with his mother.       CHIEF COMPLAINT:  \"I get really bad thoughts and I just cannot take it sometimes.\"       HISTORY OF PRESENT ILLNESS:  The patient has a history of prominent methamphetamine usage, further complicated by mood and possibly psychotic symptoms.  He was brought to the emergency room by EMS after being found [place] the [place] [address] [place] with the intent to jump to commit suicide.  Records indicate that he had also ingested stimulants and methamphetamine prior to the incident.  He was placed on a 72-hour hold and transferred to our behavioral health unit.         On examination today, he tells me that approximately 3 months ago he was hospitalized for his first mental health hospitalization following his first suicide attempt via overdose on medications.  After spending a few days at the [facility], his mental health symptoms were stabilized on a combination of Zyprexa and gabapentin.  He vaguely recalls being diagnosed with schizophrenia at that time.  After gaining stability, he was transitioned to the [facility] chemical addiction treatment facility where he maintained residence for a little over 20 days.  During that time he recalls that he felt very well, maintaining mood stability and eventually felt that he no longer needed the support of a facility given the improvements he had made.  He then discharged himself from the facility and returned back home with " his mother.  He discontinued the medication on his own, questioning whether he needed them and sometime afterwards intrusive thoughts reemerged.  He describes these intrusive thoughts as being his primary mental health symptom.  When these thoughts occur, he experiences thoughts of wanting to rape people, although is not able to identify any specific person.  He does not desire acting on these thoughts and therefore experiences much emotional distress as he attempts to minimize these thoughts as much as possible.  He has attempted to utilize pornography to distract himself from these thoughts in the past, however, to no avail.  More recently, he has been using illicit substances, mostly in the form of smoking methamphetamine, to help distract himself from the intensity of these thoughts.  On the day of his admission, these thoughts had intensified to where he no longer desired living if he needed to keep experiencing these intrusive thoughts.  He then contemplated suicide as a means of escaping this occurrence and had utilized stimulants and methamphetamine, then proceeded with a plan to go to the bridge while contemplating jumping as means of suicide.  Today, the intensity of symptoms has subsided some to where he is not endorsing any active suicidal thoughts.  He is hopeful to regain medications which have been helpful for him in the past and is looking forward to regaining referrals for residential treatment.       PSYCHIATRIC REVIEW OF SYSTEMS:  Regarding a depressive episode, he endorsed depressed mood, characterized as severe, mostly secondary to distress associated with ruminative thoughts of rape.  He denied anhedonia.  He does feel helpless and hopeless.  Energy is low, however, complicated by stimulant withdrawal.  Concentration is poor.  Appetite is low.  Again, he denies active suicidal and homicidal thoughts.  He was not able to endorse manic symptoms unrelated to substance usage.  Regarding psychosis,  he denied auditory hallucinations; however, described a strong presence of thoughts and hearing his own conscious influencing intrusive thoughts of rape.  He denied overt paranoid delusions or visual hallucinations or thought insertion, thought extraction, no ideas of reference.  No symptoms corresponding to PTSD, KEESHA, eating disorder.  Regarding OCD, he described intrusive obsessional thoughts pertaining to raping others.  He described these thoughts as emerging without any environmental trigger.  He finds these thoughts intrusive and uncomfortable to experience.  He clarifies no actual intent or desire to act out these thoughts.  These thoughts cause him emotional distress, that he has resorted to pornography to help alleviate the intensities.  No clear compulsions identified.  He tells me that he has not acted on these thoughts to rape others.       PAST PSYCHIATRIC HISTORY:  One prior inpatient hospitalization a few months ago through the [facility]'s mental health unit following his first suicide attempt where he overdosed on a handful of an unknown medication.  He currently does not have any outpatient mental health providers.  He was previously prescribed a combination of Zyprexa and gabapentin during his last hospitalization and found those to be helpful.  No history of SIB reported.  No history of legal commitments reported.       SUBSTANCE ABUSE HISTORY:  Drug of choice is methamphetamines which he has been using on and off over the past 3 or 4 years.  He typically smokes the substance.  He denied injecting illicit substances, denied significant alcohol usage.  He did report occasional cannabis usage with no specific pattern of use or dependence.  He has been to treatment 1 time through [facility] approximately 2 months ago.       MEDICAL HISTORY:  No active issues.       FAMILY HISTORY:  He suspects that a parent and cousin have mental illness, however, is not certain of a specific diagnosis.  No  knowledge of suicides in the family.       SOCIAL HISTORY:  Refer to the psychosocial assessment completed by our .  He tells me that he is currently single, does not have children and resides with his mother.  He is unemployed and is not in any romantic relationships at the moment.  He denied any legal issues.       MEDICAL REVIEW OF SYSTEMS:  A 10-point systems were reviewed and were positive for psychiatric symptoms as noted above, otherwise negative.       PHYSICAL EXAMINATION:   VITAL SIGNS:  Blood pressure is 135/89, respirations 16, pulse 98, temperature 97.4.  Weight is 182 pounds.       The rest of the physical examination was reviewed in the emergency room documentation.       MENTAL STATUS EXAMINATION:  The patient appears his stated age, appropriately dressed in hospital scrubs, lying in bed, unkempt, disheveled.  Eye contact was limited, mostly looking down.  He woke up from his sleep and sat up in bed.  He would frequently scratch his head quite intensely in an anxious fashion.  Most responses were limited to 3 or 4 words at most.  Speech was soft in volume, not pushed or pressured.  No psychomotor abnormalities.  Mood was described as being sad.  Affect was blunted.  Thought process was linear and tight.  Thought content did not display evidence of overt psychosis.  He did not appear paranoid.  He did not appear to be responding to any psychotic stimuli.  He denied active suicidal and homicidal thoughts.  Associations were intact.  Insight and judgment appear fair.  Cognition appeared intact to interviewer including orientation to person, place, time and situation, use of language, fund of knowledge, recent and remote memory.  Muscle strength, tone and gait appear normal on visual inspection.       ASSESSMENT:   1.  Unspecified depressive disorder,         Rule out a substance-induced mood disorder (amphetamines).          Rule out a primary mood disorder (MDD versus bipolar disorder),          Rule out obsessive-compulsive disorder.   2.  Stimulant use disorder, amphetamine type substance, severe.   3.  Cannabis use disorder, mild      PLAN:   1.  The patient has been [facility] to [facility] under a 72-hour hold for depressed mood and suicidal thoughts.  Treatment will be continued voluntarily.   2.  The patient reports gaining a positive response from a combination of Zyprexa and gabapentin to address mood and anxiety symptoms.  I will restart these medications as we continue assessments to further evaluate his diagnosis to ensure appropriate treatment plan.   3.  I discussed with him pursuing neuropsychological testing to help gain diagnostic clarification, which he was agreeable for.  I will await reduction of amphetamine withdrawal symptoms before pursuing the evaluation to ensure good cooperation and accurate testing.   4.  Labs were reviewed including a drug screen positive for cannabis and amphetamines.  CMP and CBC are within normal limits.  We will also order a TSH to rule out other potential factors complicating mood symptoms along with a lipid panel for baseline functioning as we plan to initiate Zyprexa.   5.  Psychosocial treatments to be addressed with social work consult and groups.  The patient is agreeable to initiate a chemical health assessment to explore residential treatment options.   6.  Anticipated hospital stay of 1 week as we target improvement in mood with remission of suicidal thoughts and formulate a plan of care to affectively transition his care out of the hospital.           PARKER RODRIGUEZ MD      Collateral Contacts     Name    [facility]'s EMR   Relationship     Phone Number     Releases           Information Provided:  This writer reviewed this patients Electronic Medical Record and the information reviewed largely supports the information the patient reported during their CD evaluation.    llateral Contacts      A problematic pattern of alcohol/drug use leading  to clinically significant impairment or distress, as manifested by at least two of the following, occurring within a 12-month period:    Alcohol/drug is often taken in larger amounts or over a longer period than was intended.  There is a persistent desire or unsuccessful efforts to cut down or control alcohol/drug use  A great deal of time is spent in activities necessary to obtain alcohol, use alcohol, or recover from its effects.  Recurrent alcohol/drug use resulting in a failure to fulfill major role obligations at work, school or home.  Continued alcohol use despite having persistent or recurrent social or interpersonal problems caused or exacerbated by the effects of alcohol/drug.  Important social, occupational, or recreational activities are given up or reduced because of alcohol/drug use.  Alcohol/drug use is continued despite knowledge of having a persistent or recurrent physical or psychological problem that is likely to have been caused or exacerbated by alcohol.  Tolerance, as defined by either of the following: A need for markedly increased amounts of alcohol/drug to achieve intoxication or desired effect.  Withdrawal, as manifested by either of the following: The characteristic withdrawal syndrome for alcohol/drug (refer to Criteria A and B of the criteria set for alcohol/drug withdrawal).      Specify if: In early remission:  After full criteria for alcohol/drug use disorder were previously met, none of the criteria for alcohol/drug use disorder have been met for at least 3 months but for less than 12 months (with the exception that Criterion A4,  Craving or a strong desire or urge to use alcohol/drug  may be met).     In sustained remission:   After full criteria for alcohol use disorder were previously met, non of the criteria for alcohol/drug use disorder have been met at any time during a period of 12 months or longer (with the exception that Criterion A4,  Craving or strong desire or urge to use  alcohol/drug  may be met).   Specify if:   This additional specifier is used if the individual is in an environment where access to alcohol is restricted.    Mild: Presence of 2-3 symptoms    Moderate: Presence of 4-5 symptoms    Severe: Presence of 6 or more symptoms

## 2018-01-16 NOTE — PROGRESS NOTES
Completed Rule 25 assessment with the patient. Rule 25 and applicable paperwork was faxed to [facility] Clinical Review Team at [phone].

## 2018-01-16 NOTE — PLAN OF CARE
Problem: Depressive Symptoms  Goal: Depressive Symptoms  Signs and symptoms of listed problems will be absent or manageable.   Outcome: No Change  Saeed was isolative to room sleeping all shift except for up for meals. He was primarily sleeping. He endorsed both irritability and anxiety, requested stelazine twice for anxiety. He was encouraged to go to groups as he wants to go to treatment after [address] and treatment is heavily group focused. He stated he would be fine going to groups in treatment because it was required there, but not required here. I encouraged him to go to groups here regardless, he was agreeable to start trying. He stated he would shower this evening.

## 2018-01-17 PROCEDURE — 25000132 ZZH RX MED GY IP 250 OP 250 PS 637: Performed by: PSYCHIATRY & NEUROLOGY

## 2018-01-17 PROCEDURE — 12400007 ZZH R&B MH INTERMEDIATE UMMC

## 2018-01-17 PROCEDURE — 99232 SBSQ HOSP IP/OBS MODERATE 35: CPT | Performed by: PSYCHIATRY & NEUROLOGY

## 2018-01-17 RX ADMIN — OLANZAPINE 20 MG: 20 TABLET, FILM COATED ORAL at 21:37

## 2018-01-17 RX ADMIN — GABAPENTIN 300 MG: 300 CAPSULE ORAL at 14:08

## 2018-01-17 RX ADMIN — GABAPENTIN 300 MG: 300 CAPSULE ORAL at 21:37

## 2018-01-17 RX ADMIN — TRIFLUOPERAZINE HYDROCHLORIDE 8 MG: 2 TABLET, FILM COATED ORAL at 21:37

## 2018-01-17 RX ADMIN — TRAZODONE HYDROCHLORIDE 150 MG: 150 TABLET ORAL at 21:37

## 2018-01-17 RX ADMIN — GABAPENTIN 300 MG: 300 CAPSULE ORAL at 09:04

## 2018-01-17 ASSESSMENT — ACTIVITIES OF DAILY LIVING (ADL)
ORAL_HYGIENE: INDEPENDENT
GROOMING: INDEPENDENT
DRESS: INDEPENDENT

## 2018-01-17 NOTE — PLAN OF CARE
"Problem: Depressive Symptoms  Goal: Social and Therapeutic (Depression)  Signs and symptoms of listed problems will be absent or manageable.   INITIAL NOTE:     Attendance: 10 min    Group: Pt initially attended OT Clinic designed to facilitate and assess communication, social interaction, and cognition. Pt was attentive during overview of group purpose and expectation. Pt contributed \"goal directed\" as a positive outcome of engaging in creative tasks.    Social: Pt affect was irritable but his interactions were appropriate. Pt left group before selecting a goal directed task. This writer thanked and encouraged pt to attend more groups.    Pt will be given written self assessment form to obtain pt goals, personal strength, and understanding of current hospitalization.      "

## 2018-01-17 NOTE — PROGRESS NOTES
Pt spent most all of the shift in his room, he reluctantly checked in with staff and answered very quickly appearing annoyed but compliant. Pt denies SI SIB, did not shower and did not have any visitors. Pt did not shower and appears dishoveled.      01/16/18 2200   Behavioral Health   Insight poor   Judgement impaired   Eye Contact at floor   Affect blunted, flat   Mood hopeless   Physical Appearance/Attire disheveled   Hygiene neglected grooming - unclean body, hair, teeth   Suicidality other (see comments)  (denies)   1. Wish to be Dead No   2. Non-Specific Active Suicidal Thoughts  No   3. Active Sucidal Ideation with any Methods (Not Plan) Without Intent to Act  No   4. Active Suicidal Ideation with Some Intent to Act, Without Specific Plan  No   5. Active Suicidal Ideation with Specific Plan and Intent  No   Change in Protective Factors? No   Self Injury other (see comment)  (denies)   Activity isolative;withdrawn   Speech circumstantial

## 2018-01-17 NOTE — PROGRESS NOTES
01/17/18 1522   Behavioral Health   Hallucinations denies / not responding to hallucinations   Thinking other (see comment)  (reports ongoing intrusive thoughts of raping people)   Orientation person: oriented;place: oriented   Memory baseline memory   Insight poor   Judgement impaired   Affect blunted, flat   Mood depressed;anxious   Physical Appearance/Attire disheveled;appears stated age;attire appropriate to age and situation   Hygiene other (see comment)  (adequate)   Suicidality other (see comments)  (denied SI)   Self Injury other (see comment)  (denied SIB urges)   Elopement (no indicators this shift)   Activity isolative;withdrawn  (in room and bed most of shift)   Speech clear;coherent   Psychomotor / Gait balanced;steady   Sleep/Rest/Relaxation   Day/Evening Time Hours napping;resting in bed   Number of hours napping 4 hours   Number of hours resting in bed 2 hours   Safety   Suicidality Status 15   Psycho Education   Type of Intervention 1:1 intervention   Response participates with encouragement   Hours 0.5   Treatment Detail current MH status   Groups   Details no group involvement   Behavioral Health Interventions   Depression maintain safety precautions;maintain safe secure environment;establish therapeutic relationship;assist with developing and utilizing healthy coping strategies;build upon strengths;monitor confusion, memory loss, decision making ability and reorient / intervene as needed   Social and Therapeutic Interventions (Depression) encourage socialization with peers;encourage participation in therapeutic groups and milieu activities

## 2018-01-18 VITALS
HEART RATE: 95 BPM | TEMPERATURE: 97 F | WEIGHT: 187 LBS | SYSTOLIC BLOOD PRESSURE: 123 MMHG | BODY MASS INDEX: 28.34 KG/M2 | OXYGEN SATURATION: 96 % | HEIGHT: 68 IN | RESPIRATION RATE: 16 BRPM | DIASTOLIC BLOOD PRESSURE: 87 MMHG

## 2018-01-18 PROCEDURE — 25000132 ZZH RX MED GY IP 250 OP 250 PS 637: Performed by: PSYCHIATRY & NEUROLOGY

## 2018-01-18 PROCEDURE — 12400007 ZZH R&B MH INTERMEDIATE UMMC

## 2018-01-18 PROCEDURE — 99232 SBSQ HOSP IP/OBS MODERATE 35: CPT | Performed by: PSYCHIATRY & NEUROLOGY

## 2018-01-18 RX ADMIN — TRAZODONE HYDROCHLORIDE 150 MG: 150 TABLET ORAL at 21:29

## 2018-01-18 RX ADMIN — TRIFLUOPERAZINE HYDROCHLORIDE 8 MG: 2 TABLET, FILM COATED ORAL at 21:29

## 2018-01-18 RX ADMIN — OLANZAPINE 20 MG: 20 TABLET, FILM COATED ORAL at 21:29

## 2018-01-18 RX ADMIN — GABAPENTIN 300 MG: 300 CAPSULE ORAL at 21:29

## 2018-01-18 RX ADMIN — GABAPENTIN 300 MG: 300 CAPSULE ORAL at 14:28

## 2018-01-18 RX ADMIN — FLUOXETINE 20 MG: 20 CAPSULE ORAL at 14:28

## 2018-01-18 RX ADMIN — GABAPENTIN 300 MG: 300 CAPSULE ORAL at 08:33

## 2018-01-18 ASSESSMENT — ACTIVITIES OF DAILY LIVING (ADL)
ORAL_HYGIENE: INDEPENDENT
GROOMING: INDEPENDENT
ORAL_HYGIENE: INDEPENDENT
GROOMING: HANDWASHING;INDEPENDENT
DRESS: INDEPENDENT
DRESS: SCRUBS (BEHAVIORAL HEALTH);INDEPENDENT
LAUNDRY: WITH SUPERVISION

## 2018-01-18 NOTE — PROGRESS NOTES
"[facility], [place]   Psychiatric Progress Note         Interim History and Subjective Reports:   The patient's care was discussed with the treatment team during the daily team meeting.  Staff reports: no acute issues    Depression severity scale 0-10 (10=most severe):  Today: 6    Intrusive thoughts are reported as being less prominent.  This has allowed his mood to improve.  No intention on acting on them.    Suicidal thoughts are no longer reported.  No active plan or intent reported.  He is able to contract for safety.  Denied homicidal thoughts.    He denied auditory and visual hallucinations.    Energy is low, appetite is better, concentration is improving.  No cravings for illicit substance use reported.  He remains motivated to maintain sobriety.  Tolerating medications without side effects.    He tells me that he is hoping to be out of the hospital by the Super Bowl and more so this weekend for the football game on Sunday.  He had tickets of the game last week with his brother and was upset to have missed that.  He is hoping to watch the game with his brother this weekend.  He inquired regarding discharge plans.           Scheduled Medications:       OLANZapine  20 mg Oral At Bedtime     gabapentin  300 mg Oral TID          Allergies:    No Known Allergies       Labs:   No results found for this or any previous visit (from the past 24 hour(s)).       Vitals:   /87 (BP Location: Right arm)  Pulse 95  Temp 97  F (36.1  C)  Resp 16  Ht 1.727 m (5' 8\")  Wt 84.8 kg (187 lb)  SpO2 96%  BMI 28.43 kg/m2  Weight: 187 lbs 0 oz          Height: 5' 8\"           Body mass index is 28.43 kg/(m^2).        Mental Status Examination:   Appearance: awake, alert and poorly groomed  Attitude:  cooperative  Eye Contact:  fair  Mood:  anxious and sad   Affect:  intensity is blunted  Speech:  clear, coherent  Psychomotor Behavior:  no evidence of tardive dyskinesia, dystonia, or " tics  Throught Process:  logical and linear  Associations:  no loose associations  Thought Content:  no evidence of suicidal ideation or homicidal ideation and no evidence of psychotic thought  Insight:  fair  Judgement:  intact  Oriented to:  time, person, and place  Attention Span and Concentration:  fair  Recent and Remote Memory:  intact         DIagnoses:     1.  Unspecified depressive disorder,         Rule out a substance-induced mood disorder (amphetamines).          Rule out a primary mood disorder (MDD versus bipolar disorder),         Rule out obsessive-compulsive disorder.   2.  Stimulant use disorder, amphetamine type substance, severe.   3.  Cannabis use disorder, mild         Plan:   1. Biological treatments:  --Add prozac to target depressive symptoms in combination with zyprexa.     2. Psychosocial treatments:   --addressed with SW consult and groups  --Pending neuropsychological testing report  --Chemical health assessment complete    3. Dispo:  --Recommended transitioning directly into a residential CD treatment facility  --As his mental health symptoms are improving, he is considering discharge from the hospital soon with plans to stay with family until a bed becomes available at a treatment facility. At this point, he would not meet criteria to be placed under an involuntary hold.

## 2018-01-18 NOTE — PROGRESS NOTES
Pt was isolative and withdrawn most of the evening, resting in bed and napping.  Came out of his room now and then.  Depressed and anxious..

## 2018-01-18 NOTE — PROGRESS NOTES
"[facility], [place]   Psychiatric Progress Note         Interim History and Subjective Reports:   The patient's care was discussed with the treatment team during the daily team meeting.  Staff reports: no acute issues    Mood is slightly better in comparison to last exam.  Depression severity scale 0-10 (10=most severe):  Today: 7    Mood remains depressed; ranging from moderate to severe.  Mostly influenced by intrusive thoughts as previously mentioned.  No intention on acting on them.    Suicidal thoughts are present.  No active plan or intent reported.  He is able to contract for safety.  Denied homicidal thoughts.    He denied auditory and visual hallucinations however did not reference his conscience having a lot of presents.    Energy is low, appetite is low, concentration is poor.  Tolerating medications without side effects.           Scheduled Medications:       OLANZapine  20 mg Oral At Bedtime     gabapentin  300 mg Oral TID          Allergies:    No Known Allergies       Labs:   No results found for this or any previous visit (from the past 24 hour(s)).       Vitals:   /87 (BP Location: Right arm)  Pulse 95  Temp 97  F (36.1  C) (Tympanic)  Resp 16  Ht 1.727 m (5' 8\")  Wt 83.5 kg (184 lb)  SpO2 96%  BMI 27.98 kg/m2  Weight: 184 lbs 0 oz          Height: 5' 8\"           Body mass index is 27.98 kg/(m^2).        Mental Status Examination:   Appearance: poorly groomed and fatigued  Attitude:  cooperative  Eye Contact:  poor   Mood:  depressed  Affect:  intensity is blunted  Speech:  clear, coherent  Psychomotor Behavior:  no evidence of tardive dyskinesia, dystonia, or tics  Throught Process:  linear  Associations:  no loose associations  Thought Content:  no evidence of psychotic thought and active suicidal ideation present  Insight:  fair  Judgement:  intact  Oriented to:  time, person, and place  Attention Span and Concentration:  fair  Recent and Remote Memory:  " intact         DIagnoses:     1.  Unspecified depressive disorder,         Rule out a substance-induced mood disorder (amphetamines).          Rule out a primary mood disorder (MDD versus bipolar disorder),         Rule out obsessive-compulsive disorder.   2.  Stimulant use disorder, amphetamine type substance, severe.   3.  Cannabis use disorder, mild         Plan:   1. Biological treatments:  --Continue current medications as we monitor response and tolerability  --His current presentation may be complicated by amphetamine withdrawal.  We will continue to assess as withdrawal symptoms diminished over the next few days.    2. Psychosocial treatments:   --addressed with SW consult and groups  --Initiated psychology consult for neuropsychological testing for diagnostic clarification; the patient is agreeable.  --Obtain a chemical health assessment; he is agreeable.     3. Dispo:  --Attempt to transition directly into a residential CD treatment facility

## 2018-01-18 NOTE — PLAN OF CARE
Problem: Depressive Symptoms  Goal: Depressive Symptoms  Signs and symptoms of listed problems will be absent or manageable.   Outcome: Improving  Patient did agree to take shower today and was out of room a little bit longer. He remains guarded with his responses but looks a little more comfortable on the unit. He has been medication compliant blunted sad affect, little socialization.

## 2018-01-18 NOTE — PROGRESS NOTES
"[facility], [place]   Psychiatric Progress Note         Interim History and Subjective Reports:   The patient's care was discussed with the treatment team during the daily team meeting.  Staff reports: no acute issues    Mood is slightly better in comparison to last exam.  Depression severity scale 0-10 (10=most severe):  Today: 7    Mood remains depressed; ranging from moderate to severe.  Mostly influenced by intrusive thoughts as previously mentioned.  No intention on acting on them.    Suicidal thoughts are present.  No active plan or intent reported.  He is able to contract for safety.  Denied homicidal thoughts.    He denied auditory and visual hallucinations however did not reference his conscience having a lot of presents.    Energy is low, appetite is low, concentration is poor.  Tolerating medications without side effects.           Scheduled Medications:       OLANZapine  20 mg Oral At Bedtime     gabapentin  300 mg Oral TID          Allergies:    No Known Allergies       Labs:   No results found for this or any previous visit (from the past 24 hour(s)).       Vitals:   /87 (BP Location: Right arm)  Pulse 95  Temp 97  F (36.1  C) (Tympanic)  Resp 16  Ht 1.727 m (5' 8\")  Wt 83.5 kg (184 lb)  SpO2 96%  BMI 27.98 kg/m2  Weight: 184 lbs 0 oz          Height: 5' 8\"           Body mass index is 27.98 kg/(m^2).        Mental Status Examination:   Appearance: poorly groomed and fatigued  Attitude:  cooperative  Eye Contact:  poor   Mood:  depressed  Affect:  intensity is blunted  Speech:  clear, coherent  Psychomotor Behavior:  no evidence of tardive dyskinesia, dystonia, or tics  Throught Process:  linear  Associations:  no loose associations  Thought Content:  no evidence of psychotic thought and active suicidal ideation present  Insight:  fair  Judgement:  intact  Oriented to:  time, person, and place  Attention Span and Concentration:  fair  Recent and Remote Memory:  " intact         DIagnoses:     1.  Unspecified depressive disorder,         Rule out a substance-induced mood disorder (amphetamines).          Rule out a primary mood disorder (MDD versus bipolar disorder),         Rule out obsessive-compulsive disorder.   2.  Stimulant use disorder, amphetamine type substance, severe.   3.  Cannabis use disorder, mild         Plan:   1. Biological treatments:  --Consider adding an antidepressant if depressive symptoms continue  --His current presentation may be complicated by amphetamine withdrawal.  We will continue to assess as withdrawal symptoms diminished over the next few days.    2. Psychosocial treatments:   --addressed with SW consult and groups  --Pending psychology consult for neuropsychological testing for diagnostic clarification; the patient is agreeable.  --Obtain a chemical health assessment; he is agreeable.     3. Dispo:  --Attempt to transition directly into a residential CD treatment facility

## 2018-01-19 PROCEDURE — 25000132 ZZH RX MED GY IP 250 OP 250 PS 637: Performed by: PSYCHIATRY & NEUROLOGY

## 2018-01-19 PROCEDURE — 96103 ZZHC PSYCH TEST BY COMP, MCMI-3 PROFILE: CPT

## 2018-01-19 PROCEDURE — 96103 ZZHC PSYCH TEST BY COMP, MMPI-2 PROFILE: CPT

## 2018-01-19 PROCEDURE — 99239 HOSP IP/OBS DSCHRG MGMT >30: CPT | Performed by: PSYCHIATRY & NEUROLOGY

## 2018-01-19 RX ORDER — OLANZAPINE 20 MG/1
20 TABLET ORAL AT BEDTIME
Qty: 30 TABLET | Refills: 0 | Status: SHIPPED | OUTPATIENT
Start: 2018-01-19

## 2018-01-19 RX ORDER — TRIFLUOPERAZINE HYDROCHLORIDE 2 MG/1
4-8 TABLET, FILM COATED ORAL 3 TIMES DAILY PRN
Qty: 30 TABLET | Refills: 0 | Status: SHIPPED | OUTPATIENT
Start: 2018-01-19

## 2018-01-19 RX ORDER — GABAPENTIN 400 MG/1
400 CAPSULE ORAL 3 TIMES DAILY
Qty: 90 CAPSULE | Refills: 0 | Status: SHIPPED | OUTPATIENT
Start: 2018-01-19

## 2018-01-19 RX ORDER — TRAZODONE HYDROCHLORIDE 150 MG/1
150 TABLET ORAL
Qty: 30 TABLET | Refills: 0 | Status: SHIPPED | OUTPATIENT
Start: 2018-01-19

## 2018-01-19 RX ADMIN — GABAPENTIN 300 MG: 300 CAPSULE ORAL at 08:48

## 2018-01-19 RX ADMIN — TRIFLUOPERAZINE HYDROCHLORIDE 8 MG: 2 TABLET, FILM COATED ORAL at 20:19

## 2018-01-19 RX ADMIN — GABAPENTIN 300 MG: 300 CAPSULE ORAL at 14:32

## 2018-01-19 RX ADMIN — OLANZAPINE 20 MG: 20 TABLET, FILM COATED ORAL at 20:19

## 2018-01-19 RX ADMIN — GABAPENTIN 300 MG: 300 CAPSULE ORAL at 20:19

## 2018-01-19 RX ADMIN — FLUOXETINE 20 MG: 20 CAPSULE ORAL at 08:48

## 2018-01-19 ASSESSMENT — ACTIVITIES OF DAILY LIVING (ADL)
ORAL_HYGIENE: INDEPENDENT
GROOMING: INDEPENDENT
LAUNDRY: WITH SUPERVISION
DRESS: INDEPENDENT

## 2018-01-19 NOTE — PROGRESS NOTES
Pt was resting in bed on and off throughout the evening. He states he's waiting for the results of his Rule 25 assessment and MICD placement options.  He continues to feel depressed, but denies SI and SIB.  He reports decreased concentration, disorganized thinking and distracted thinking.  His affect is blunted to flat, he is unkempt.  He states he was at [facility] for CD treatment, but dropped out of the program which now he regrets but hopes to have the opportunity to return.  He reports feeling anxious, but hopeful that he could go onto MICD treatment.         01/18/18 2052   Behavioral Health   Hallucinations denies / not responding to hallucinations   Thinking poor concentration;distractable   Orientation person: oriented;place: oriented;date: oriented   Memory baseline memory   Insight poor   Judgement impaired   Eye Contact at examiner   Affect blunted, flat;tense   Mood depressed;anxious;mood is calm   Physical Appearance/Attire appears stated age;untidy;disheveled   Hygiene neglected grooming - unclean body, hair, teeth   Suicidality other (see comments)  (Currently denies SI.)   1. Wish to be Dead No   2. Non-Specific Active Suicidal Thoughts  No   Self Injury other (see comment)  (Denies SIB urges/thoughts. )   Elopement (Currently does not appear to be an elopement risk. )   Activity isolative;withdrawn   Speech clear;coherent   Medication Sensitivity no stated side effects;no observed side effects   Psychomotor / Gait balanced;steady   Sleep/Rest/Relaxation   Day/Evening Time Hours napping;resting in bed   Number of hours napping 1 hours   Number of hours resting in bed 1 hours   Coping/Psychosocial   Verbalized Emotional State acceptance;anxiety;depression;frustration;grief;sadness   Plan Of Care Reviewed With patient   Patient Agreement with Plan of Care agrees   Supportive Measures active listening utilized;counseling provided;decision-making supported;goal setting facilitated;relaxation techniques  promoted;self-reflection promoted;self-responsibility promoted;verbalization of feelings encouraged   Trust Relationship/Rapport care explained;choices provided;emotional support provided;empathic listening provided;questions answered;questions encouraged;reassurance provided;thoughts/feelings acknowledged   Group Therapy Session   Group Attendance attended group session   Group Type community   Activities of Daily Living   Hygiene/Grooming handwashing;independent   Oral Hygiene independent   Dress scrubs (behavioral health);independent   Room Organization independent   Activity   Activity Assistance Provided independent   Hygiene Care Assistance   Hygiene Assistance per patient   Groups   Details Community Meeting  (Attended and participated in group activities. )   Behavioral Health Interventions   Depression maintain safe secure environment;assist patient in developing safety plan;assist patient in following safety plan;encourage participation / independence with adls;provide emotional support;establish therapeutic relationship;assist with developing and utilizing healthy coping strategies;build upon strengths;assess patient response to medication;assess medication adherance;monitor need for prn medication   Social and Therapeutic Interventions (Depression) encourage socialization with peers;encourage participation in therapeutic groups and milieu activities
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
## 2018-01-19 NOTE — DISCHARGE SUMMARY
[facility], [place]  Department of Psychiatry    DATE OF ADMISSION:  1/9/2018    DATE OF DISCHARGE:  January 19, 2018    DISCHARGE DIAGNOSES:   1.  Major Depressive Disorder - recurrent, severe with psychotic features        Rule out a substance-induced mood/psychotic disorder (amphetamines).          Rule out obsessive-compulsive disorder.   2.  Stimulant use disorder, amphetamine type substance, severe.   3.  Cannabis use disorder, mild    HOSPITAL COURSE: (Refer to H&P, progress notes, and consult notes for details)    The patient was admitted to our Behavioral Health Unit under a 72 hour hold for depressed mood and suicidal thoughts.  Treatment was continued voluntarily.  He was restarted on his prior effective medications which included a combination of Zyprexa and gabapentin to address mood and anxiety symptoms.  Prozac was added to address ongoing depressive symptoms.  Trazodone was used for management of sleep.  Noting ongoing breakthrough anxiety, he requested medication to use as needed.  Noting a history of chemical usage, he intended to avoid benzodiazepines.  He came limited response to Vistaril.  Stelazine was added as needed for severe anxiety with good response.  He may continue Stelazine over the next 30 days until Zyprexa and gabapentin has reached her full therapeutic effect at which point Stelazine can then be tapered off or discontinued to avoid polypharmacy.  His mood symptoms gradually improved, anxiety was better managed, obsessive thoughts are diminished.  He no longer reported experiencing suicidal thoughts.  He was agreeable to pursue neuropsychological testing for diagnostic clarification however his responses on the testing were interpreted as being invalid and not interpretable.  The patient met with our  to explore available treatment options outside the hospital.  A chemical health assessment was completed and referrals were initiated for MI   treatment facilities.  As he was awaiting bed availability, he requested to be discharged home noting the improvements he had gained.  Since he did not meet criteria to be placed under an involuntary hold, he was discharged home per his request.    Other interventions received during his hospitalization included:   Psychosocial treatments were addressed with groups, social work consult, and supportive milieu provided by staff.    CONDITION AT DISCHARGE:  Improved.  The patients acute suicide risk is low due to the following factors:  improved mood/anxiety symptoms.  Denies suicidal ideations. Denies psychotic symptoms.  Not actively intoxicated and plans to abstain from illicit substances and alcohol.  Denies access to guns.  Denies feeling hopeless or helpless. At the time of discharge Saede Mccullough was determined to not be an immediate danger to herself or others. The patient's acute risk will be higher if noncompliant with treatment plan, medications, follow-up or using illicit substances or alcohol.  These findings along with the risks of noncompliance with medications and treatment plan, which could potentially cause decompensation and increase the risk for suicide, were discussed with the patient.  The patients chronic suicide risk is moderate given the following factors: past suicide attempt; white race; diagnosis of MDD, unemployed; homeless; history of chemical dependency; Denied a family history of suicide.  Preventative factors include: social supports, stable housing     MENTAL STATUS EXAMINATION AT TIME OF DISCHARGE:  The patient is 32 year old White male who appears their stated age and is appropriately dressed with good hygiene.  Calm and cooperative with the interview questions.  No psychomotor abnormalities are noted. Eye contact is appropriate. Speech has normal rate, tone, latency and volume and is not pushed or pressured. Mood is euthymic and affect is full and appropriate.  The patient does  not seem overtly depressed, anxious, manic or irritable.  Thought process is linear, logical and future oriented.  Thought process is not tangential, circumstantial or disorganized.  Thought content is not significant for apperant paranoia, delusions, ideas of reference or grandiosity.  The patient denies suicidal and homicidal ideations as well as auditory and visual hallucinations.  Insight and judgment are fair.  Cognition appears intact to interviewing including orientation, recent and remote memory, fund of knowledge, use of language, attention span and concentration.  Muscle strength, tone and gait appear normal on visual inspection.      DISPOSITION:  The patient is discharged home     FOLLOWUP APPOINTMENTS:  ( per social workers notes and after visit summary)  1.  Referral placed to the [facility] and he was awaiting bed availability at the time of discharge.  2.  Medication management through [facility] in [place] February 1    DISCHARGE MEDICATIONS:   Current Discharge Medication List      START taking these medications    Details   gabapentin (NEURONTIN) 400 MG capsule Take 1 capsule (400 mg) by mouth 3 times daily  Qty: 90 capsule, Refills: 0    Associated Diagnoses: Anxiety      FLUoxetine (PROZAC) 20 MG capsule Take 1 capsule (20 mg) by mouth daily  Qty: 30 capsule, Refills: 0    Associated Diagnoses: Severe recurrent major depressive disorder with psychotic features (H)      traZODone (DESYREL) 150 MG tablet Take 1 tablet (150 mg) by mouth nightly as needed for sleep  Qty: 30 tablet, Refills: 0    Associated Diagnoses: Severe recurrent major depressive disorder with psychotic features (H)      trifluoperazine (STELAZINE) 2 MG tablet Take 2-4 tablets (4-8 mg) by mouth 3 times daily as needed (severe anxiety or hallucinations)  Qty: 30 tablet, Refills: 0    Associated Diagnoses: Severe recurrent major depressive disorder with psychotic features (H); Anxiety         CONTINUE  these medications which have CHANGED    Details   OLANZapine (ZYPREXA) 20 MG tablet Take 1 tablet (20 mg) by mouth At Bedtime  Qty: 30 tablet, Refills: 0    Associated Diagnoses: Severe recurrent major depressive disorder with psychotic features (H)              LABORATORY RESULTS: (past 14 days)  Recent Results (from the past 336 hour(s))   EKG 12 lead    Collection Time: 01/09/18  8:07 PM   Result Value Ref Range    Interpretation ECG Click View Image link to view waveform and result    CBC with platelets differential    Collection Time: 01/09/18  8:25 PM   Result Value Ref Range    WBC 14.3 (H) 4.0 - 11.0 10e9/L    RBC Count 5.04 4.4 - 5.9 10e12/L    Hemoglobin 15.8 13.3 - 17.7 g/dL    Hematocrit 45.7 40.0 - 53.0 %    MCV 91 78 - 100 fl    MCH 31.3 26.5 - 33.0 pg    MCHC 34.6 31.5 - 36.5 g/dL    RDW 11.5 10.0 - 15.0 %    Platelet Count 329 150 - 450 10e9/L    Diff Method Automated Method     % Neutrophils 83.5 %    % Lymphocytes 11.1 %    % Monocytes 5.0 %    % Eosinophils 0.1 %    % Basophils 0.1 %    % Immature Granulocytes 0.2 %    Nucleated RBCs 0 0 /100    Absolute Neutrophil 12.0 (H) 1.6 - 8.3 10e9/L    Absolute Lymphocytes 1.6 0.8 - 5.3 10e9/L    Absolute Monocytes 0.7 0.0 - 1.3 10e9/L    Absolute Eosinophils 0.0 0.0 - 0.7 10e9/L    Absolute Basophils 0.0 0.0 - 0.2 10e9/L    Abs Immature Granulocytes 0.0 0 - 0.4 10e9/L    Absolute Nucleated RBC 0.0    Comprehensive metabolic panel    Collection Time: 01/09/18  8:25 PM   Result Value Ref Range    Sodium 141 133 - 144 mmol/L    Potassium 3.5 3.4 - 5.3 mmol/L    Chloride 104 94 - 109 mmol/L    Carbon Dioxide 32 20 - 32 mmol/L    Anion Gap 5 3 - 14 mmol/L    Glucose 100 (H) 70 - 99 mg/dL    Urea Nitrogen 14 7 - 30 mg/dL    Creatinine 1.11 0.66 - 1.25 mg/dL    GFR Estimate 78 >60 mL/min/1.7m2    GFR Estimate If Black >90 >60 mL/min/1.7m2    Calcium 9.1 8.5 - 10.1 mg/dL    Bilirubin Total 0.5 0.2 - 1.3 mg/dL    Albumin 4.0 3.4 - 5.0 g/dL    Protein Total 7.7 6.8  - 8.8 g/dL    Alkaline Phosphatase 65 40 - 150 U/L    ALT 37 0 - 70 U/L    AST 12 0 - 45 U/L   Acetaminophen level    Collection Time: 01/09/18  8:25 PM   Result Value Ref Range    Acetaminophen Level <2 mg/L   Salicylate level    Collection Time: 01/09/18  8:25 PM   Result Value Ref Range    Salicylate Level <2 mg/dL   Drug abuse screen 6 urine (tox)    Collection Time: 01/09/18 11:13 PM   Result Value Ref Range    Amphetamine Qual Urine Positive (A) NEG^Negative    Barbiturates Qual Urine Negative NEG^Negative    Benzodiazepine Qual Urine Negative NEG^Negative    Cannabinoids Qual Urine Positive (A) NEG^Negative    Cocaine Qual Urine Negative NEG^Negative    Ethanol Qual Urine Negative NEG^Negative    Opiates Qualitative Urine Negative NEG^Negative   Lipid panel reflex to direct LDL    Collection Time: 01/12/18  8:05 AM   Result Value Ref Range    Cholesterol 187 <200 mg/dL    Triglycerides 128 <150 mg/dL    HDL Cholesterol 35 (L) >39 mg/dL    LDL Cholesterol Calculated 126 (H) <100 mg/dL    Non HDL Cholesterol 152 (H) <130 mg/dL   TSH with free T4 reflex    Collection Time: 01/12/18  8:05 AM   Result Value Ref Range    TSH 2.89 0.40 - 4.00 mU/L       >30 minutes was spent on this discharge to allow for reviewing the patient's response to treatment, reviewing plan of care, education on medications and diagnosis, and conducting a risk assessment.

## 2018-01-19 NOTE — PROGRESS NOTES
Pt discharging today.  His mother will pick him up after she gets off of work at 5:30pm.  Mom is Melissa, work number is [phone] and have her paged.

## 2018-01-19 NOTE — PROGRESS NOTES
Patient discharging 1/19/2018 accompanied by his mother and destination is his mother's home.     Discharge paperwork reviewed and medications reviewed with .Saeed Mccullough who verbalizes understanding.     Patient has verbalized understanding of discharge instructions and medication administration.     Copy of AVS reviewed and signed for.      Patient was cooperative with the discharge process. Patient denies suicidal ideation and self injurious thoughts. Denies homicidal ideation. Denies having any access to guns. Reports that his anxiety was a 9   (1 low-10 high) and depression a 6. Denies any auditory or visual hallucinations. Affect is flat and is a bit disheveled. Ate dinner and is awaiting his mother's arrival which will be around 5:30 pm. Patient stated that he will call [facility] daily to check if a bed becomes available. Taking his medications he says will be important to getting better.     Medications from pharmacy given and signed for.    Security items given and signed for.    Locker items given and signed for.    Survey provided

## 2018-01-19 NOTE — DISCHARGE INSTRUCTIONS
Behavioral Discharge Planning and Instructions      Summary:  You were admitted on 1/9/2018  due to sucidal ideation.  You were treated by Dr. Adilson Pang MD and discharged on 01/19/2018 from [facility] to Home with your mother while you await a bed opening at [facility].      Principal Diagnosis:    Unspecified depressive disorder,         Rule out a substance-induced mood disorder (amphetamines).          Rule out a primary mood disorder (MDD versus bipolar disorder),         Rule out obsessive-compulsive disorder.   Stimulant use disorder, amphetamine type substance, severe.   Cannabis use disorder, mild    Health Care Follow-up Appointments:   1. Chemical Dependency Treatment;  You were referred to [facility] residential treatment program.  At the time of your discharge a bed was not available, however you are to contact [facility] [phone] everyday including weekends to check and see if a bed is available.    2. Medication Management Appointment:  Date: Thursday, February 1st, 2018  Time: 1:30pm   Provider: Aurora Thomas MD  The location for your upcoming appointment:  [place] [id number] [facility]  [address] [place] [address]   Phone: [phone]   The Health Unit Coordinator has faxed these discharge instructions to Fax: [phone]  Attend all scheduled appointments with your outpatient providers. Call at least 24 hours in advance if you need to reschedule an appointment to ensure continued access to your outpatient providers.   Major Treatments, Procedures and Findings:  You were provided with: a psychiatric assessment, assessed for medical stability, medication evaluation and/or management, group therapy, individual therapy, CD evaluation/assessment, milieu management and medical interventions    Symptoms to Report: feeling more aggressive, increased confusion, losing more sleep, mood getting worse or thoughts of suicide    Early warning signs can include:  increased depression or anxiety sleep disturbances increased thoughts or behaviors of suicide or self-harm  increased unusual thinking, such as paranoia or hearing voices    Safety and Wellness:  Take all medicines as directed.  Make no changes unless your doctor suggests them.      Follow treatment recommendations.  Refrain from alcohol and non-prescribed drugs.  If there is a concern for safety, call 911.    Resources:   COPE (Community Outreach for Psychiatric Emergencies): [phone].Available 24-hours a day, 7 days a week. Call a COPE professional when a severe disturbance of mood or thinking is impacting your safety. COPE professionals are available to go where you are, handle an immediate crisis, and provide a clinical assessment. If needed, COPE will arrange an emergency evaluation for inpatient psychiatric services. Telephone consultations are also available. Ask them if you meet criteria for a crisis residence.   *You can also talk to FREDY about crisis stabilization services if you are experiencing difficulty with the transition from the hospital.  [facility] Acute [facility]iatric Services  Acute Psychiatric Services/Crisis Intervention: [phone]  24-hour walk-in crisis intervention and treatment of behavioral emergencies    Located at:  [address] [facility] -  in the Emergency room on the first floor of the [facility]  [address] [place] [address]  Phone: [phone]  This is a crisis residence where you can stay for a short time if you feel like you need to stabilize but do not need to go to the hospital.    Crisis Connection 24/7 Community Call Center: [phone]  Phone: [phone] outside the [facility] area: [phone]  www.crisis.org   Hours: 24 hours/day, 7 days/week  Services: Free and confidential telephone counseling provided by trained volunteers supervised by professional staff. Early intervention and brief supportive  counseling for callers with issues of depression, stress and anxiety, domestic violence, parent/child conflicts, family issues, loneliness, grief and loss, suicidal ideation and chronic mental illness.    [facility] [place] [facility]  You and your family would benefit from the support groups at [facility].   Www.[facility].org    www.[facility].org    The [place] [facility] has a parent support and educator staff - Crow [place] - that can be a support for your parents. There are also many classes that are available to you and your family.  Crow can be reached at [phone] xt 106 or through e-mail at [email or web address].    Mya,  please take care and make your recovery a daily recovery. The treatment team has appreciated the opportunity to work with you.   If you have any questions or concerns our unit number is [phone].

## 2018-01-22 NOTE — CONSULTS
Consult Date:  2018      [facility] CLINICAL MULTIAXIAL INVENTORY-III (MCMI-III) INTERPRETATION:      ADMISSION DATE:  2018        INTERPRETATION TEST RESULTS:  The MCMI-III was responded to in an open and honest manner and the profile is valid and interpretable.  Individuals with profiles similar to his tend to have an avoidant interactive style.  They likely are self-critical.  They may be seen as emotionally dysregulated.  They manifest depressive and anxiety symptoms.  They likely are at a higher risk for compulsive behaviors such as drug and alcohol use.  They may have some somatic based complaints and worry about their health.  They usually feel disconnected from others.         DANIEL CRESPO, PHD, LP             D: 2018   T: 2018   MT: RHIANNA      Name:     BRANDIN RUIZ   MRN:      [id number]        Account:       [id number]   :      1985           Consult Date:  2018      Document: [id number]       cc: Adilson Pang MD

## 2018-01-23 NOTE — CONSULTS
Consult Date:  2018      MMPI-2 INTERPRETATION:  The MMPI-2 was responded to such in a random manner that the profile likely is neither valid nor interpretable.  These individuals endorse a high number of symptoms.  They may have been confused by some of the items.  Thus, no further interpretation could be done at this time.         DANIEL CRESPO, PHD, LP             D: 2018   T: 2018   MT: SUDHIR      Name:     BRANDIN RUIZ   MRN:      [id number]        Account:       [id number]   :      1985           Consult Date:  2018      Document: [id number]

## 2019-01-10 ENCOUNTER — BEH TREATMENT PLAN (OUTPATIENT)
Dept: BEHAVIORAL HEALTH | Facility: CLINIC | Age: 34
End: 2019-01-10
Attending: PSYCHIATRY & NEUROLOGY

## 2019-01-10 ENCOUNTER — HOSPITAL ENCOUNTER (OUTPATIENT)
Dept: BEHAVIORAL HEALTH | Facility: CLINIC | Age: 34
Discharge: HOME | End: 2019-01-10
Attending: PSYCHIATRY & NEUROLOGY | Admitting: PSYCHIATRY & NEUROLOGY
Payer: COMMERCIAL

## 2019-01-10 DIAGNOSIS — F33.2 MAJOR DEPRESSIVE DISORDER, RECURRENT EPISODE, SEVERE WITH ANXIOUS DISTRESS (H): ICD-10-CM

## 2019-01-10 PROCEDURE — 90791 PSYCH DIAGNOSTIC EVALUATION: CPT | Performed by: MARRIAGE & FAMILY THERAPIST

## 2019-01-10 RX ORDER — BUPROPION HYDROCHLORIDE 300 MG/1
300 TABLET ORAL EVERY MORNING
COMMUNITY

## 2019-01-10 ASSESSMENT — PAIN SCALES - GENERAL: PAINLEVEL: WORST PAIN (10)

## 2019-01-10 NOTE — PROGRESS NOTES
Acknowledgement of Current Treatment Plan       I have reviewed my Initial Individual Treatment Plan with my therapist / on 1/10/2019.   I agree with the plan as it is written in the electronic health record.                      Signature Below:  Saeed Mccullough      Patient      ALISA Guillaume Psychotherapist    Admitting Provider: Admitting Provider Signature Below:   Dr Octavio Melendez MD   Psychiatrist    Dr Briseyda Montgomery MD  Psychiatrist    Dr Lopez Lin MD  Psychiatrist    Dr Abhilash Vega MD  Psychiatrist    Marc Escamilla, [place]  Psychiatric Provider

## 2019-01-10 NOTE — PROGRESS NOTES
"Initial Individual Treatment Plan     Patient: Saeed Mccullough   MRN: [id number]  : 1985  Age: 33 year old  Sex: male    Diagnostic Assessment Date / Date of Initial Individual Treatment Plan: 1/10/19      Immediate Health Concerns:  No. Per history, see safety plan.     Immediate Safety Concerns:  No. Per history, see safety plan.    Identify the issues to be addressed in treatment:  Symptom Management, Personal Safety, Community Resources/Discharge Planning, Abstinence/Relapse Prevention, Develop / Improve Independent Living Skills, Develop Socialization / Interpersonal Relationship Skills, Cultural / Spirituality and Physical Health     Client Initial Individualized Goals for Treatment: \"becoming a better man, to not be dazing off\".        Initial Treatment suggestions for the client during the time between Diagnostic Assessment and completion of the Individualized Treatment Plan:  Follow Safety Plan  Abstain from Substance Use   Ask for more information, support and/or assistance as needed.  Follow up with providers/community supports as needed: follow up with Dr Thomas and [facility]  Report increases or changes in symptoms to staff.  Report any personal safety concerns to staff.   Take medications as prescribed.  Report medication changes and/or side effects to staff.  Attend and participate in groups as scheduled or notify staff if unable to do so.  Report any use of substances to staff as this may impact your symptoms and/or personal safety.  Notify staff if you have any other issues that need to be addressed. This may include any current abuse / neglect / exploitation or other vulnerability.  Follow recommendations of your treatment team and discuss concerns if not in agreement.     Treatment Team Responsible: MI/CD Treatment (MICD)      Therapeutic Interventions/Treatment Strategies may include:  Support, Redirection, Feedback, Limit/Boundaries, Safety Assessments, Structured Activity, Problem " Solving, Clarification, Education, Motivational Enhancement and Relapse Prevention as needed.    Efraín Boone LMFT

## 2019-01-10 NOTE — PROGRESS NOTES
"Adult Outpatient Mental Health Programs    MY COPING PLAN FOR SAFETY    NAME: Saeed Mccullough  MRN: [id number]  SAFETY PLAN:  Step 1: Warning signs / cues (Thoughts, images, mood, situation, behavior) that a crisis may be developing:    Thoughts: If there isnt hope    Images: paranoia    Thinking Processes: ruminations (can't stop thinking about my problems): ., racing thoughts, intrusive thoughts (bothersome, unwanted thoughts that come out of nowhere): ., disorganized thinking: . and paranoia: .    Mood: worsening depression, intense anger, intense worry and agitation    Behaviors: isolating/withdrawing , using drugs, using alcohol and can't stop crying    Situations: relationship problems   Step 2: Coping strategies - Things I can do to take my mind off of my problems without contacting another person (relaxation technique, physical activity):    Distress Tolerance Strategies:  listen to positive and upbeat music: ., watch a funny movie: . and pray    Physical Activities: meditation and deep breathing    Focus on helpful thoughts:  \"I will get through this\" and remind myself of what is important to me: .  Step 3: People and social settings that provide distraction:   Name:    Phone:     park and library   Step 4: Remind myself of people and things that are important to me and worth living for:  My brother and mom  Step 5: When I am in crisis, I can ask these people to help me use my safety plan:   Name: Kermit  Phone:     Step 6: Making the environment safe:     remove alcohol, remove drugs and be around others  Step 7: Professionals or agencies I can contact during a crisis:    Suicide Prevention Lifeline: 8-904-435-KTBR (6335)    Crisis Text Line Service (available 24 hours a day, 7 days a week): Text [place] to 289690    Call  **CRISIS (095228) from a cell phone to talk to a team of professionals who can help you.  Crisis Services By [facility]: Phone Number:   [place]     [phone]   [place]    [phone]   [place]" [phone]   [place]    [phone]   [place]    [phone]   [place] [phone]   [place]     [phone]       Call 911 or go to my nearest emergency department.     I helped develop this safety plan and agree to use it when needed.  I have been given a copy of this plan.      Client signature _________________________________________________________________  Today s date:  1/10/2019  Adapted from Safety Plan Template 2008 Mariluz Salas and Channing Berumen is reprinted with the express permission of the authors.  No portion of the Safety Plan Template may be reproduced without the express, written permission.  You can contact the authors at [email or web address] or [email or web address].
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
## 2019-01-11 ENCOUNTER — TELEPHONE (OUTPATIENT)
Dept: BEHAVIORAL HEALTH | Facility: CLINIC | Age: 34
End: 2019-01-11

## 2019-01-14 ENCOUNTER — TELEPHONE (OUTPATIENT)
Dept: BEHAVIORAL HEALTH | Facility: CLINIC | Age: 34
End: 2019-01-14

## 2019-01-15 ENCOUNTER — HOSPITAL ENCOUNTER (OUTPATIENT)
Dept: BEHAVIORAL HEALTH | Facility: CLINIC | Age: 34
End: 2019-01-15
Attending: PSYCHIATRY & NEUROLOGY
Payer: COMMERCIAL

## 2019-01-15 PROBLEM — F33.2 MAJOR DEPRESSIVE DISORDER, RECURRENT EPISODE, SEVERE WITH ANXIOUS DISTRESS (H): Status: ACTIVE | Noted: 2019-01-15

## 2019-01-15 LAB
AMPHETAMINES UR QL SCN: POSITIVE
BARBITURATES UR QL: NEGATIVE
BENZODIAZ UR QL: NEGATIVE
CANNABINOIDS UR QL SCN: POSITIVE
COCAINE UR QL: NEGATIVE
ETHANOL UR QL SCN: NEGATIVE
OPIATES UR QL SCN: NEGATIVE

## 2019-01-15 PROCEDURE — 80307 DRUG TEST PRSMV CHEM ANLYZR: CPT | Performed by: PSYCHIATRY & NEUROLOGY

## 2019-01-15 PROCEDURE — H2012 BEHAV HLTH DAY TREAT, PER HR: HCPCS

## 2019-01-15 PROCEDURE — 80359 METHYLENEDIOXYAMPHETAMINES: CPT | Performed by: PSYCHIATRY & NEUROLOGY

## 2019-01-15 PROCEDURE — 80324 DRUG SCREEN AMPHETAMINES 1/2: CPT | Performed by: PSYCHIATRY & NEUROLOGY

## 2019-01-15 PROCEDURE — 80349 CANNABINOIDS NATURAL: CPT | Performed by: PSYCHIATRY & NEUROLOGY

## 2019-01-15 PROCEDURE — 80320 DRUG SCREEN QUANTALCOHOLS: CPT | Performed by: PSYCHIATRY & NEUROLOGY

## 2019-01-15 ASSESSMENT — ANXIETY QUESTIONNAIRES
7. FEELING AFRAID AS IF SOMETHING AWFUL MIGHT HAPPEN: NOT AT ALL
5. BEING SO RESTLESS THAT IT IS HARD TO SIT STILL: NOT AT ALL
1. FEELING NERVOUS, ANXIOUS, OR ON EDGE: SEVERAL DAYS
3. WORRYING TOO MUCH ABOUT DIFFERENT THINGS: SEVERAL DAYS
GAD7 TOTAL SCORE: 3
2. NOT BEING ABLE TO STOP OR CONTROL WORRYING: SEVERAL DAYS
6. BECOMING EASILY ANNOYED OR IRRITABLE: NOT AT ALL

## 2019-01-15 ASSESSMENT — PATIENT HEALTH QUESTIONNAIRE - PHQ9
SUM OF ALL RESPONSES TO PHQ QUESTIONS 1-9: 9
5. POOR APPETITE OR OVEREATING: NOT AT ALL

## 2019-01-15 NOTE — PROGRESS NOTES
"Admission Date: 1/15/2019    The Initial Individual Treatment Plan was reviewed with Saeed Focamryn upon admission.      Saeed reported his last use of substances as: \"2 days ago\"    Identify any current concerns with potential to impact admission:     withdrawal/intoxication: Slight withdrawal symptoms, cannot describe     medication/medical concerns: Denies     immediate safety concerns:Denies SI/SIB     Other: None    DIMENSION  ADMIT RATING   1 Acute Intoxication / Withdrawal Potential 1   2 Biomedical Conditions & Complications 0   3 Emotional / Behavioral / Cognitive Conditions & Complications 3   4 Treatment Acceptance / Resistance: 1   5 Continued Use / Relapse Prevention 3   6 Recovery Environment 2         Completed by: PORSHA Cordero          "

## 2019-01-15 NOTE — PROGRESS NOTES
"Adult Dual Disorder Progress Note / Treatment Plan Review       Patient: Saeed Mccullough   MRN: [id number]  : 1985  Age: 33 year old  Sex: male    Week of: 1/15/19-19    Client Initial Individualized Goals for Treatment:  \"becoming a better man, to not be dazing off\".    See Initial Treatment suggestions for the client during the time between Diagnostic Assessment and completion of the Master Individualized Treatment Plan.    Treatment Goals:     Treatment plan will be formulated on 19.    Active Psychiatric Symptoms Impairing:   Thought, Mood, Behavior and Perception    Area of Treatment Focus:  Symptom Management, Personal Safety, Community Resources/Discharge Planning and Abstinence/Relapse Prevention    Treatment Strategies:   Support, Feedback, Limit/Boundaries, Safety Assessments, Structured Activity, Problem Solving, Clarification, Education, Motivational Enhancement Therapy and Cognitive Behavioral Therapy    Patient Response:  Disengaged      Dimension Risk Description and Outcome:    Dimension One: Acute Intoxication/Withdrawal Potential   Daily Note:  1/15/2019:  Theo reports that his last use of chemicals was 2 days ago. (AGUSTO) 2019:  Theo states that he has been sober from meth for three days and is starting to experience withdrawal symptoms.  (AGUSTO) 2019: Theo reported continued sobriety and no withdrawal symptoms. (DE)    Dimension Two: Biomedical Conditions and Complications  Daily Note: 1/15/2019:  Denies recent physical health concerns.  (AGUSTO)  2019:  Reports discomfort from withdrawal.  (AGUSTO) 2019: Theo reported no physical health concerns. (DE)     Dimension Three: Emotional/Behavioral / Cognitive Conditions & Complications  Daily Note:  1/15/2019:  (11-11:50am):  Theo briefly introduced himself to the group.  He opted to not take time in psychotherapy and denies thoughts to self harm.  (AGUSTO)  2019: (9-9:50am):  Theo reports that he is " "feeling \"agitated\" as a result of his withdrawal symptoms.  He states that he is not suicidal and is doing his best to cope with his agitation by listening to music and playing video games.  Theo states that he does not wish to take time in psychotherapy.  (AGUSTO) 1/18/2019 (9:00-9:50AM): Theo endorsed absence of suicidal ideation and self-injurious behavior urges. He shared that he did experience passive suicidal ideation on Wednesday of this week with no intent, method or means. He committed to following his written safety plan if thoughts become active. Theo shared that he is currently working on self-affirmations. He also shared that he is working on \"implementing strategies and putting them into practice\" but would not elaborate on what strategies. Theo declined to take time in group and participated minimally. (DE)     Dimension Four: Treatment Acceptance / Resistance  Daily Note: 1/15/2019:  Quietly observed psychotherapy. (AGUSTO) 1/16/2019 Emotions Management (11:00-11:50AM): Writer showed video and facilitated discussion  on opposite action skill. Theo listened attentively to video. When asked what he learned from skill or how he can apply it, Theo stated that he had a lot on his mind and was unsure how he can apply skill.  Theo can benefit from further assistance in applying skill. (DE)  1/16/2019: Interpersonal Relationships (10-10:50am):  Theo viewed the video on \"Handling Conflict\", however he did not participate in the group discussion that occurred following the video. (AGUSTO) 1/18/2019 Autobiography (11:00-11:50AM): Theo was inattentive during autobiography group and appeared to be sleeping. He did not offer feedback to peer. (DE)     Dimension Five: Continued Use/Relapse Prevention  Daily Note: 1/15/2019:  Remains at high risk for continued use of meth.  (AGUSTO) 1/16/2019:  Remains at high risk for use of chemicals.  (AGUSTO) 1/18/2019: Risk of relapse is moderate to high due to " severity of mental health symptoms. (DE) 1/18/2019 (7157-2422) Pt reports significant cravings secondary to anxiety. Pt reports he has no coping skills to manage his cravings/anxiety. We discussed breathing exercises through a phone application pt could use as well as for meditation exercises (SUMMER).     Dimension Six: Recovery Environment  Daily Note:  1/15/2019:  Has minimal support.  (AGUSTO)  1/16/2019:  Lives with mother and brother.  (AGUSTO) 1/18/2019: Reported no changes. (DE)     Weekly Progress / Treatment Plan Review      Are there additional progress notes for this week? No    Are Treatment Plan Goals being addressed? Treatment plan will be formulated on 1/22/19.    Are treatment plan strategies to address goals effective? N/A    Are there any current contracts in place? No    Client: N/A     Client: N/A    Was client case reviewed with Lopez Lin MD and/or Briseyda Montgomery MD and the treatment team this week? yes    Psychotherapists contributing to this note:    Group: Group Psychotherapy Date/Time: 1/15/2019 / 1100 to 1150; Number of Participants: 6    Facilitated by: PORSHA Cordero(AGUSTO)    Group: Emotions Management Date/Time: 1/16/2019 / 11:00 to 11:50; Number of Participants: 5    Facilitated by: Yahaira Mccoy MA, [facility] (DE)    Group: Group Psychotherapy Date/Time: 1/16/2019 / 900 to 950; Number of Participants: 5    Facilitated by: PORSHA Cordero(AGUSTO)    Group: Interpersonal Relationships Date/Time: 1/16/2019 / 1000 to 1050; Number of Participants: 5    Facilitated by: PORSHA Cordero(AGUSTO)    Group: Group Psychotherapy Date/Time: 1/18/2019 / 9:00 to 9:50; Number of Participants: 5    Facilitated by: Yahaira Mccoy MA [facility] (DE)     Group: Relapse Prevention  Date/Time: 1/18/2019  / 1000 to 1050; Number of Participants: 5    Facilitated by: Leonidas Cristina MA [facility]    Group: Autobiography Date/Time: 1/18/2019 / 11:00 to 11:50; Number of Participants: 6    Facilitated by: Yahaira  BRIANNA Mccoy MA, [facility], [facility] (DE)

## 2019-01-15 NOTE — PROGRESS NOTES
"Adult Mental Health Outpatient Group Therapy Progress Note     Client Initial Individualized Goals for Treatment:    \"becoming a better man, to not be dazing off\".     See Initial Treatment suggestions for the client during the time between Diagnostic Assessment and completion of the Master Individualized Treatment Plan.    Treatment Goals:     see above     Area of Treatment Focus:  Symptom Management, Community Resources/Discharge Planning, Abstinence/Relapse Prevention, Develop / Improve Independent Living Skills, Develop Socialization / Interpersonal Relationship Skills and Cultural / Spirituality    Therapeutic Interventions/Treatment Strategies:  Support, Feedback, Structured Activity, Problem Solving, Clarification, Education and Motivational Enhancement Therapy    Response to Treatment Strategies:  Accepted Feedback, Listened, Focused on Goals, Accepted Support, Alert and Distracted     Name of Group: Life Skills   Date/Time: 1/15/19 / 9:00 to 9:50    Number of Group Participants: 6     Description and Outcome:  Theo attended and participated in an experiential Psychoeducation group where rehabilitative intervention focuses on learning, developing through practice, and generalizing independent living skills. The purpose of this group is to stabilize and manage mental health symptoms, reduce/eliminate substance use, and to improve participation and function in valued roles, routines, relationships. This was his first day and group and he introduced himself to others with prompts. He presented with blunt affect and low energy and mood. He was open to complete a check list of problem areas he thinks he has with daily living skills and activities. He initially checked off the first one listed - concentration, but then did not check any others as directed in writing and verbally from staff. He wrote various phrases in the description lines of each of the problems, noting things like \"soul music and nature\" in " the area of motivation/procrastination. He was on topic for many of the other areas, noting that he plays video games and listens to music but needs more daily activities. Client verbalized understanding of session content by noting some ideas of techniques and skills to try to practice and others offered that he noted would not be pleasing or helpful to him. Will continue to assess.  Client would benefit from additional opportunities to practice and implement content from this session.    Is this a Weekly Review of the Progress on the Treatment Plan?  No

## 2019-01-16 ENCOUNTER — HOSPITAL ENCOUNTER (OUTPATIENT)
Dept: BEHAVIORAL HEALTH | Facility: CLINIC | Age: 34
End: 2019-01-16
Attending: PSYCHIATRY & NEUROLOGY
Payer: COMMERCIAL

## 2019-01-16 PROCEDURE — H2012 BEHAV HLTH DAY TREAT, PER HR: HCPCS

## 2019-01-16 ASSESSMENT — ANXIETY QUESTIONNAIRES: GAD7 TOTAL SCORE: 3

## 2019-01-18 ENCOUNTER — HOSPITAL ENCOUNTER (OUTPATIENT)
Dept: BEHAVIORAL HEALTH | Facility: CLINIC | Age: 34
End: 2019-01-18
Attending: PSYCHIATRY & NEUROLOGY
Payer: COMMERCIAL

## 2019-01-18 PROCEDURE — H2012 BEHAV HLTH DAY TREAT, PER HR: HCPCS

## 2019-01-20 LAB
AMPHET UR CFM-MCNC: 6083 NG/ML
AMPHET UR QL CFM: NORMAL
D-METHAMPHET UR CFM-MCNC: >97 %
L-METHAMPHET UR CFM-MCNC: <3 %
METHAMPHET UR CFM-MCNC: NORMAL NG/ML

## 2019-01-21 ENCOUNTER — TELEPHONE (OUTPATIENT)
Dept: BEHAVIORAL HEALTH | Facility: CLINIC | Age: 34
End: 2019-01-21

## 2019-01-21 LAB
CANNABINOIDS UR CFM-MCNC: 73 NG/ML
CARBOXYTHC/CREAT UR: 30 NG/MG{CREAT}
CREAT UR-MCNC: 247 MG/DL

## 2019-01-21 NOTE — TELEPHONE ENCOUNTER
LOUISE Venegas did not attend treatment on this date and has not called to report his absence.  I.  Writer called Thoe and left a message informing him that there is in fact programming today.  A.  Unable to assess.  P.  Monitor attendance.  Thea Garcia, [facility] Center[facility]

## 2019-01-21 NOTE — PROGRESS NOTES
"Adult Dual Disorder Progress Note / Treatment Plan Review       Patient: Saeed Mccullough   MRN: [id number]  : 1985  Age: 33 year old  Sex: male    Week of: 19-19    Client Initial Individualized Goals for Treatment:  \"becoming a better man, to not be dazing off\".    See Initial Treatment suggestions for the client during the time between Diagnostic Assessment and completion of the Master Individualized Treatment Plan.    Treatment Goals:  Treatment plan has not been formulated due to minimal time in the program/discharge.      Active Psychiatric Symptoms Impairing:   Thought, Mood, Behavior and Perception    Area of Treatment Focus:  Symptom Management, Personal Safety, Community Resources/Discharge Planning and Abstinence/Relapse Prevention    Treatment Strategies:   None utilized due to absence.     Patient Response:  Not applicable.      Dimension Risk Description and Outcome:    Dimension One: Acute Intoxication/Withdrawal Potential   Daily Note:  2019:  Unable to assess due to absence.  (AGUSTO)    Dimension Two: Biomedical Conditions and Complications  Daily Note:  2019:  Unable to assess.  (AGUSTO)    Dimension Three: Emotional/Behavioral / Cognitive Conditions & Complications  Daily Note:  2019:  Unable to assess.  (AGUSTO)    Dimension Four: Treatment Acceptance / Resistance  Daily Note:  2019:  Discharged due to poor attendance.  (AGUSTO)    Dimension Five: Continued Use/Relapse Prevention  Daily Note:  2019:  Unable to assess due to absence.  (AGUSTO)    Dimension Six: Recovery Environment  Daily Note:  2019:  Unable to assess. (AGUSTO)    Weekly Progress / Treatment Plan Review      Are there additional progress notes for this week? Yes (see additional progress notes)    Are Treatment Plan Goals being addressed? Treatment plan has never been formulated due to short duration in the program.      Are treatment plan strategies to address goals effective? Client discharged    Are " there any current contracts in place? No    Client: Has been contacted about discharge via phone message.     Client: Discharge Instruction Sheet mailed to Theo.    Was client case reviewed with Lopez Lin MD and/or Briseyda Montgomery MD and the treatment team this week? yes    Psychotherapists contributing to this note:    PORSHA Cordero

## 2019-01-22 ENCOUNTER — TELEPHONE (OUTPATIENT)
Dept: BEHAVIORAL HEALTH | Facility: CLINIC | Age: 34
End: 2019-01-22

## 2019-01-22 NOTE — TELEPHONE ENCOUNTER
ROSARIOAnupam Venegas did not attend treatment on this date and has not called to report his absence.  I.  Writer attempted to call Theo and left a message requesting that he call staff to check in.  A.  Unable to assess.  P.  Call emergency contact.  Thea Garcia, [facility]

## 2019-01-22 NOTE — TELEPHONE ENCOUNTER
ROSARIO.  Writer called Melissa Mccullough to check in regarding her son Theo's absence.  I.  Writer attempted to call both numbers provided: one was out of service and the other had a full mailbox.  A.  Unable to reach Melissa Mccullough.  P.  Monitor Theo's attendance.  Thea Garcia, [facility]

## 2019-01-23 ENCOUNTER — TELEPHONE (OUTPATIENT)
Dept: BEHAVIORAL HEALTH | Facility: CLINIC | Age: 34
End: 2019-01-23

## 2019-01-23 NOTE — TELEPHONE ENCOUNTER
ROSARIOAnupam Venegas did not attend group on this date and has not called to report his absence.  I.  Writer called Theo and left a message stating that he will be discharged if he does not return to the program by tomorrow.  A.  Unable to assess.  P.  Monitor attendance and discharge if absences continue.  Thea Garcia, LICSW

## 2019-01-24 ENCOUNTER — TELEPHONE (OUTPATIENT)
Dept: BEHAVIORAL HEALTH | Facility: CLINIC | Age: 34
End: 2019-01-24

## 2019-01-24 NOTE — DISCHARGE SUMMARY
"  Adult Dual Disorder Program Discharge Summary / Instructions     Patient: Saeed Mccullough MRN: [id number]  : 1985 Age:  33 year old Sex:  male    Admission Date: 1/15/19 Discharge Date: 19    Reason for Discharge:       Poor Attendance          Prognosis: Guarded      Client Progress Toward AchievingTreatment Plan Goals / Dimensions Risk Scale       Saeed attended 9 of 24 scheduled MICD groups. Absences were due to illness and \"No call, no show\".  Theo attended 3 days of programming in which he did not participate in the group activities.  He stopped coming to group and staff have not been able to contact him or his mother who is his emergency contact.        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       Admit Discharge   PHQ-9 9 N/A   KEESHA-7 3 N/A   CGI 5/4 5/5       DIMENSION  ADMIT RATING DISCHARGE RATING   1 Acute Intoxication / Withdrawal Potential 1 1   2 Biomedical Conditions & Complications 0 0   3 Emotional / Behavioral / Cognitive Conditions & Complications 3 3   4 Treatment Acceptance / Resistance: 1 3   5 Continued Use / Relapse Prevention 3 4   6 Recovery Environment 2 2       Additional Comments: Theo is encouraged to call staff to discuss return to the program or other options for treatment.    Completed By: PORSHA Cordero        "

## 2019-01-24 NOTE — TELEPHONE ENCOUNTER
LOUISE  Theo did not attend treatment on this date and has not called to report his absence.  I.  Writer called Theo and left a message informing him that he has been discharged from the program.  A.  Unable to assess.  P.  Discharge from the program.  Thea Garcia, [facility]

## 2025-01-13 NOTE — PROGRESS NOTES
"Service Date: 1/15/2025    No Ref-Primary, Physician  No address on file     RE:  Saeed Mccullough   MRN:  [id number]   :  1985       To Whom It May Concern:    It was a pleasure participating in the care of your patient, Saeed Mccullough .  As you know, he is a 39 year old year old person who I met in person today for a positive family history of heart disease, blood pressure control, lipids.    Past medical history is significant for the followin.  Paranoid schizophrenia with psychosis/bipolar disorder/depression with suicidal ideation  2.  History of multisubstance abuse including amphetamines and benzodiazepines  3.  History of chronic medication and follow-up noncompliance  4.  Distal fibula fracture  5.  Allergic rhinitis    The patient does not have a documented history of cardiac disease    Apparently patient may have had a brother that passed away recently from underlying heart disease, and self-referred for further workup and treatment.    From a clinical standpoint, patient has been feeling some chest pain under stressful situations.  He describes it as being \"punched in the chest\" and feeling of the residual discomfort afterwards.  Cannot really describe when it happens but just on occasion at different intervals.  He does not exercise regularly anymore.  He does take some walks is able to go for couple miles without gross symptoms.  More recently he has been complaining of chest discomfort.    The past he is experienced chest discomfort and pain when he was being accompanied by the police to a mental institution he recalls having the same feeling then.    He used to abuse many different types of drugs including alcohol, amphetamines, benzodiazepines, mushrooms, ecstasy etc. but claims to not have taken any for the past year or so.    The patient otherwise denies gross chest pain, shortness of breath, PND, orthopnea, edema, palpitations, syncope or near syncope.    10 Point Review of " Systems: Negative for hypersomnolence    Cardiac risk factors: No history of diabetes, no history of hypertension, no alcohol for over a year, over a year since abusing amphetamines, and years since using mushrooms or ecstasy.  Brother  of a heart attack secondary to a 80 to 90% left main lesion according to autopsy report, he was 39 years old, he does not know his cholesterol.    MEDICATIONS:    1.  Bupropion  2.  Fluoxetine  3.  Gabapentin  4.  Olanzapine  5.  Trazodone  6.  Trifluoperazine    PHYSICAL EXAM:    Vitals: Blood pressures currently running 111/72, pulse 79, weight 192 pounds  General:  Patient appears comfortable, well groomed  Psych:  Patient is alert and oriented X 3  Eyes:  No gross erythema, exudate  Neck:  JVP: Normal  Pulm:  CTA  CV: Regular rate and rhythm no gross murmur gallop  Abdomen:  soft, non tender, positive bowel sounds  Lower extremities: No edema      LABS:    2018: Potassium 3.4, GFR normal    2018: Drug test positive for amphetamines and benzodiazepines    2018: Hemoglobin normal, TSH normal    No labs recently on file    IMPRESSION:    Saeed is a 39-year-old gentleman whose past medical history significant for paranoid schizophrenia with psychosis/bipolar disorder/depression with suicidal ideation, history of amphetamine and benzodiazepine abuse, chronic medication and follow-up noncompliance with several active issues:    1.  Positive family history of heart disease and chest pain    Is 39-year-old brother recently  of underlying coronary disease that included an 80 to 90% left main lesion.  He recently had the onset of left-sided chest pain which worsens with stressful situations.  Due to his strong family history and active symptoms, further noninvasive evaluation would be indicated.    2.  Blood pressure controlled    Blood pressures currently running 111/72 mmHg, continue to follow    3.  Lipids    He is due for fasting lipid profile and routine  labs      PLAN:    1.  Fasting labs today    2.  Coronary calcium score and coronary CTA in order to rule out significant underlying coronary disease as cause for symptoms, and to also help risk stratify for future treatment/workup    3.  Echocardiogram to rule out significant structural pathology associated with his symptoms    4.  Follow-up to be determined pending above test results.    Once again, it was a pleasure participating in the care of your patient, Saeed Mccullough .  Please feel free to contact me at any time if there are any questions regarding his care in the future.      Sincerely,          Ricardo Davalos M.D.  Cardiovascular Division  [facility]      Total time:  Including pre-visit chart review, in person face to face visit, post visit charting time as well as time for coordination of care:  60min

## 2025-01-15 ENCOUNTER — OFFICE VISIT (OUTPATIENT)
Dept: CARDIOLOGY | Facility: CLINIC | Age: 40
End: 2025-01-15
Payer: COMMERCIAL

## 2025-01-15 VITALS
BODY MASS INDEX: 29.19 KG/M2 | OXYGEN SATURATION: 99 % | WEIGHT: 192 LBS | DIASTOLIC BLOOD PRESSURE: 72 MMHG | SYSTOLIC BLOOD PRESSURE: 111 MMHG | HEART RATE: 79 BPM

## 2025-01-15 DIAGNOSIS — Z13.220 SCREENING FOR HYPERLIPIDEMIA: ICD-10-CM

## 2025-01-15 DIAGNOSIS — R00.2 PALPITATIONS: ICD-10-CM

## 2025-01-15 DIAGNOSIS — R07.9 CHEST PAIN, UNSPECIFIED TYPE: Primary | ICD-10-CM

## 2025-01-15 DIAGNOSIS — Z82.49 FAMILY HISTORY OF ISCHEMIC HEART DISEASE: ICD-10-CM

## 2025-01-15 DIAGNOSIS — Z13.6 ENCOUNTER FOR SCREENING FOR CORONARY ARTERY DISEASE: ICD-10-CM

## 2025-01-15 DIAGNOSIS — R07.89 ATYPICAL CHEST PAIN: ICD-10-CM

## 2025-01-15 LAB
BASOPHILS # BLD AUTO: 0.1 10E3/UL (ref 0–0.2)
BASOPHILS NFR BLD AUTO: 1 %
CHOLEST SERPL-MCNC: 211 MG/DL
EOSINOPHIL # BLD AUTO: 0.2 10E3/UL (ref 0–0.7)
EOSINOPHIL NFR BLD AUTO: 3 %
ERYTHROCYTE [DISTWIDTH] IN BLOOD BY AUTOMATED COUNT: 11.2 % (ref 10–15)
FASTING STATUS PATIENT QL REPORTED: YES
HCT VFR BLD AUTO: 46.3 % (ref 40–53)
HDLC SERPL-MCNC: 44 MG/DL
HGB BLD-MCNC: 16.1 G/DL (ref 13.3–17.7)
IMM GRANULOCYTES # BLD: 0 10E3/UL
IMM GRANULOCYTES NFR BLD: 0 %
LDLC SERPL CALC-MCNC: 120 MG/DL
LYMPHOCYTES # BLD AUTO: 2.5 10E3/UL (ref 0.8–5.3)
LYMPHOCYTES NFR BLD AUTO: 29 %
MCH RBC QN AUTO: 30.8 PG (ref 26.5–33)
MCHC RBC AUTO-ENTMCNC: 34.8 G/DL (ref 31.5–36.5)
MCV RBC AUTO: 89 FL (ref 78–100)
MONOCYTES # BLD AUTO: 0.7 10E3/UL (ref 0–1.3)
MONOCYTES NFR BLD AUTO: 8 %
NEUTROPHILS # BLD AUTO: 5 10E3/UL (ref 1.6–8.3)
NEUTROPHILS NFR BLD AUTO: 59 %
NONHDLC SERPL-MCNC: 167 MG/DL
NRBC # BLD AUTO: 0 10E3/UL
NRBC BLD AUTO-RTO: 0 /100
PLATELET # BLD AUTO: 293 10E3/UL (ref 150–450)
RBC # BLD AUTO: 5.22 10E6/UL (ref 4.4–5.9)
T4 FREE SERPL-MCNC: 1.38 NG/DL (ref 0.9–1.7)
TRIGL SERPL-MCNC: 235 MG/DL
TSH SERPL DL<=0.005 MIU/L-ACNC: 4.53 UIU/ML (ref 0.3–4.2)
WBC # BLD AUTO: 8.5 10E3/UL (ref 4–11)

## 2025-01-15 RX ORDER — ARIPIPRAZOLE 5 MG/1
5 TABLET ORAL DAILY
COMMUNITY
Start: 2024-01-31

## 2025-01-15 RX ORDER — PALIPERIDONE 9 MG/1
9 TABLET, EXTENDED RELEASE ORAL EVERY MORNING
COMMUNITY
Start: 2024-01-31

## 2025-01-15 RX ORDER — DIVALPROEX SODIUM 500 MG/1
2 TABLET, FILM COATED, EXTENDED RELEASE ORAL AT BEDTIME
COMMUNITY
Start: 2024-11-14

## 2025-01-15 RX ORDER — OMEGA-3/DHA/EPA/FISH OIL 60 MG-90MG
500 CAPSULE ORAL 2 TIMES DAILY
COMMUNITY

## 2025-01-15 ASSESSMENT — PATIENT HEALTH QUESTIONNAIRE - PHQ9: SUM OF ALL RESPONSES TO PHQ QUESTIONS 1-9: 6

## 2025-01-15 NOTE — NURSING NOTE
Cardiac Testing:   -CT Calcium scan   -Coronary CTA   -Echocardiogram   Patient given instructions    Labs today: CBC, TSH , FLP,    Med Reconcile: Reviewed and verified all current medications with the patient. The updated medication list was printed and given to the patient./ No medication changes.     Return Appointment:   -Follow-up to be determined     Patient stated he understood all health information given and agreed to call with further questions or concerns.

## 2025-01-15 NOTE — PATIENT INSTRUCTIONS
Take your medicines every day, as directed     Changes made today:  No medication changes   Complete a CT calcium score, CT angiogram + echo  Fasting labs today         Cardiology Care Coordinators:      Sandrita REED RN     Cardiology Rooming staff:  Deana KOCH    Phone  [phone]      Fax [phone]    To Contact us     During Business Hours:  [phone]     If you are needing refills please contact your pharmacy.     For urgent after hour care please call the [facility] at [phone] or the [facility] at [phone] and ask to speak to the cardiologist on call.            HOW TO CHECK YOUR BLOOD PRESSURE AT HOME:     Avoid eating, smoking, and exercising for at least 30 minutes before taking a reading.     Be sure you have taken your BP medication at least 2-3 hours before you check it.      Sit quietly for 10 minutes before a reading.      Sit in a chair with your feet flat on the floor. Rest your  arm on a table so that the arm cuff is at the same level as your heart.     Remain still during the reading.  Record your blood pressure and pulse in a log and bring to your next appointment.       Use [employer] allows you to communicate directly with your heart team through secure messaging.  [employer] can be accessed any time on your phone, computer, or tablet.  If you need assistance, we'd be happy to help!             Keep your Heart Appointments:     Follow-up to be determined        Cardiology Providers: Dr Davalos has requested you to have a Cardiac CT Angiogram.  This has been scheduled at the [facility] on 3/13/2025 at 9 AM ; please arrive at the [facility] WAITING AREA 1 HOUR PRIOR (8: AM).    Please follow these INSTRUCTIONS for PREP of your CT SCAN:  1.  PLEASE DO NOT EAT OR DRINK 3 HOURS PRIOR TO PROCEDURE  2.  PLEASE  DO NOT USE ALCOHOL, CAFFEINE OR TOBACCO 12 HOURS PRIOR TO THE PROCEDURE    Please also call your insurance company for any billing questions regarding the test.

## 2025-01-15 NOTE — NURSING NOTE
"Chief Complaint   Patient presents with    Family History of artherosclerotic disease       Initial /72 (BP Location: Right arm, Patient Position: Chair, Cuff Size: Adult Regular)   Pulse 79   Wt 87.1 kg (192 lb)   SpO2 99%   BMI 29.19 kg/m   Estimated body mass index is 29.19 kg/m  as calculated from the following:    Height as of 1/10/18: 1.727 m (5' 8\").    Weight as of this encounter: 87.1 kg (192 lb)..  BP completed using cuff size: regular    Francia Hamm, Visit Facilitator    "

## 2025-01-24 ENCOUNTER — ANCILLARY PROCEDURE (OUTPATIENT)
Dept: CARDIOLOGY | Facility: CLINIC | Age: 40
End: 2025-01-24
Payer: COMMERCIAL

## 2025-01-24 DIAGNOSIS — Z82.49 FAMILY HISTORY OF ISCHEMIC HEART DISEASE: ICD-10-CM

## 2025-01-24 DIAGNOSIS — R07.9 CHEST PAIN, UNSPECIFIED TYPE: ICD-10-CM

## 2025-01-24 LAB — LVEF ECHO: NORMAL

## 2025-01-24 PROCEDURE — 93306 TTE W/DOPPLER COMPLETE: CPT | Performed by: INTERNAL MEDICINE

## 2025-01-27 ENCOUNTER — PATIENT OUTREACH (OUTPATIENT)
Dept: CARE COORDINATION | Facility: CLINIC | Age: 40
End: 2025-01-27
Payer: COMMERCIAL

## 2025-01-27 NOTE — PROGRESS NOTES
Social Work Telephone Note  [facility]     Patient Name:  Saede Mccullough  /Age:  1985 (39 year old)    Referral Source: Dr Davalos, cardiology  Reason for Referral:  disability     contacted Patient via telephone on 25. Sw had been consulted to connect with patient regarding resources for support and disability questions. Saeed returned call with friend Zafar also the phone with questions regarding social security disability. Writer provided information on process and how to move forward with applying. Provided them with contact information for [employer] if he is interested in having an  represent him.   Zafar and Saeed were appreciative of call and information. Provided them with writer contact information and encouraged them to call with any additional concerns or questions. Sw will continue to assist as able.         GLADYS Childs, [facility]    [facility]  [phone]  [email or web address]

## 2025-02-12 ENCOUNTER — OFFICE VISIT (OUTPATIENT)
Dept: FAMILY MEDICINE | Facility: CLINIC | Age: 40
End: 2025-02-12
Payer: COMMERCIAL

## 2025-02-12 VITALS
TEMPERATURE: 98.4 F | WEIGHT: 206 LBS | OXYGEN SATURATION: 97 % | BODY MASS INDEX: 31.22 KG/M2 | DIASTOLIC BLOOD PRESSURE: 83 MMHG | HEIGHT: 68 IN | HEART RATE: 96 BPM | RESPIRATION RATE: 20 BRPM | SYSTOLIC BLOOD PRESSURE: 134 MMHG

## 2025-02-12 DIAGNOSIS — E66.09 CLASS 1 OBESITY DUE TO EXCESS CALORIES WITHOUT SERIOUS COMORBIDITY WITH BODY MASS INDEX (BMI) OF 31.0 TO 31.9 IN ADULT: ICD-10-CM

## 2025-02-12 DIAGNOSIS — E03.8 SUBCLINICAL HYPOTHYROIDISM: ICD-10-CM

## 2025-02-12 DIAGNOSIS — E78.2 MIXED HYPERLIPIDEMIA: ICD-10-CM

## 2025-02-12 DIAGNOSIS — M72.2 PLANTAR FASCIITIS: ICD-10-CM

## 2025-02-12 DIAGNOSIS — R06.83 SNORING: ICD-10-CM

## 2025-02-12 DIAGNOSIS — Z82.49 FAMILY HISTORY OF PREMATURE CORONARY ARTERY DISEASE: ICD-10-CM

## 2025-02-12 DIAGNOSIS — G47.09 OTHER INSOMNIA: Chronic | ICD-10-CM

## 2025-02-12 DIAGNOSIS — F33.2 MAJOR DEPRESSIVE DISORDER, RECURRENT EPISODE, SEVERE WITH ANXIOUS DISTRESS (H): ICD-10-CM

## 2025-02-12 DIAGNOSIS — Z71.89 GRIEF COUNSELING: ICD-10-CM

## 2025-02-12 DIAGNOSIS — F20.0 PARANOID SCHIZOPHRENIA (H): ICD-10-CM

## 2025-02-12 DIAGNOSIS — Z11.4 SCREENING FOR HIV (HUMAN IMMUNODEFICIENCY VIRUS): ICD-10-CM

## 2025-02-12 DIAGNOSIS — Z13.228 SCREENING FOR METABOLIC DISORDER: ICD-10-CM

## 2025-02-12 DIAGNOSIS — Z00.00 ROUTINE GENERAL MEDICAL EXAMINATION AT A HEALTH CARE FACILITY: Primary | ICD-10-CM

## 2025-02-12 DIAGNOSIS — E66.811 CLASS 1 OBESITY DUE TO EXCESS CALORIES WITHOUT SERIOUS COMORBIDITY WITH BODY MASS INDEX (BMI) OF 31.0 TO 31.9 IN ADULT: ICD-10-CM

## 2025-02-12 PROBLEM — F19.10 POLYSUBSTANCE ABUSE (H): Status: ACTIVE | Noted: 2017-12-26

## 2025-02-12 PROBLEM — R44.3 HALLUCINATIONS: Status: ACTIVE | Noted: 2020-09-06

## 2025-02-12 PROBLEM — F19.94 SUBSTANCE INDUCED MOOD DISORDER (H): Status: ACTIVE | Noted: 2018-07-28

## 2025-02-12 PROBLEM — F39 EPISODIC MOOD DISORDER: Status: ACTIVE | Noted: 2017-12-27

## 2025-02-12 PROBLEM — T50.902A INTENTIONAL DRUG OVERDOSE (H): Status: ACTIVE | Noted: 2018-02-25

## 2025-02-12 PROBLEM — D72.825 BANDEMIA: Status: RESOLVED | Noted: 2018-02-25 | Resolved: 2025-02-12

## 2025-02-12 PROBLEM — F15.951 AMPHETAMINE AND PSYCHOSTIMULANT-INDUCED PSYCHOSIS WITH HALLUCINATIONS (H): Status: ACTIVE | Noted: 2025-02-12

## 2025-02-12 PROBLEM — D72.825 BANDEMIA: Status: ACTIVE | Noted: 2018-02-25

## 2025-02-12 PROBLEM — F20.9 SCHIZOPHRENIA (H): Status: ACTIVE | Noted: 2017-12-26

## 2025-02-12 PROBLEM — Z91.148 NONCOMPLIANCE WITH MEDICATIONS: Status: ACTIVE | Noted: 2018-12-08

## 2025-02-12 PROBLEM — F15.21: Status: ACTIVE | Noted: 2017-08-02

## 2025-02-12 PROBLEM — R79.89 ELEVATED LACTIC ACID LEVEL: Status: RESOLVED | Noted: 2018-02-25 | Resolved: 2025-02-12

## 2025-02-12 PROBLEM — F13.10 BENZODIAZEPINE ABUSE (H): Status: ACTIVE | Noted: 2018-12-08

## 2025-02-12 PROBLEM — F15.10 METHAMPHETAMINE ABUSE (H): Status: ACTIVE | Noted: 2025-02-12

## 2025-02-12 PROBLEM — F10.21 ALCOHOL USE DISORDER, SEVERE, IN SUSTAINED REMISSION, DEPENDENCE (H): Chronic | Status: ACTIVE | Noted: 2023-07-23

## 2025-02-12 PROBLEM — E22.1 HYPERPROLACTINEMIA: Chronic | Status: ACTIVE | Noted: 2023-08-01

## 2025-02-12 PROBLEM — F22 PSYCHOSIS, PARANOID (H): Status: ACTIVE | Noted: 2024-10-22

## 2025-02-12 PROBLEM — R79.89 ELEVATED LACTIC ACID LEVEL: Status: ACTIVE | Noted: 2018-02-25

## 2025-02-12 PROCEDURE — 87389 HIV-1 AG W/HIV-1&-2 AB AG IA: CPT | Performed by: NURSE PRACTITIONER

## 2025-02-12 PROCEDURE — 80048 BASIC METABOLIC PNL TOTAL CA: CPT | Performed by: NURSE PRACTITIONER

## 2025-02-12 PROCEDURE — 36415 COLL VENOUS BLD VENIPUNCTURE: CPT | Performed by: NURSE PRACTITIONER

## 2025-02-12 PROCEDURE — 99204 OFFICE O/P NEW MOD 45 MIN: CPT | Mod: 25 | Performed by: NURSE PRACTITIONER

## 2025-02-12 PROCEDURE — 99385 PREV VISIT NEW AGE 18-39: CPT | Mod: 25 | Performed by: NURSE PRACTITIONER

## 2025-02-12 PROCEDURE — 83695 ASSAY OF LIPOPROTEIN(A): CPT | Performed by: NURSE PRACTITIONER

## 2025-02-12 RX ORDER — OMEGA-3/DHA/EPA/FISH OIL 60 MG-90MG
500 CAPSULE ORAL 2 TIMES DAILY
Qty: 90 CAPSULE | Refills: 3 | Status: SHIPPED | OUTPATIENT
Start: 2025-02-12

## 2025-02-12 RX ORDER — PALIPERIDONE PALMITATE 234 MG/1.5ML
234 INJECTION INTRAMUSCULAR
COMMUNITY
Start: 2025-01-24

## 2025-02-12 RX ORDER — TEMAZEPAM 30 MG/1
30 CAPSULE ORAL
COMMUNITY
Start: 2024-11-14

## 2025-02-12 RX ORDER — NAPROXEN 500 MG/1
500 TABLET ORAL 2 TIMES DAILY PRN
Qty: 90 TABLET | Refills: 0 | Status: SHIPPED | OUTPATIENT
Start: 2025-02-12

## 2025-02-12 SDOH — HEALTH STABILITY: PHYSICAL HEALTH: ON AVERAGE, HOW MANY MINUTES DO YOU ENGAGE IN EXERCISE AT THIS LEVEL?: 0 MIN

## 2025-02-12 SDOH — HEALTH STABILITY: PHYSICAL HEALTH: ON AVERAGE, HOW MANY DAYS PER WEEK DO YOU ENGAGE IN MODERATE TO STRENUOUS EXERCISE (LIKE A BRISK WALK)?: 0 DAYS

## 2025-02-12 ASSESSMENT — PATIENT HEALTH QUESTIONNAIRE - PHQ9
SUM OF ALL RESPONSES TO PHQ QUESTIONS 1-9: 0
10. IF YOU CHECKED OFF ANY PROBLEMS, HOW DIFFICULT HAVE THESE PROBLEMS MADE IT FOR YOU TO DO YOUR WORK, TAKE CARE OF THINGS AT HOME, OR GET ALONG WITH OTHER PEOPLE: NOT DIFFICULT AT ALL
SUM OF ALL RESPONSES TO PHQ QUESTIONS 1-9: 0

## 2025-02-12 ASSESSMENT — PAIN SCALES - GENERAL: PAINLEVEL_OUTOF10: NO PAIN (0)

## 2025-02-12 ASSESSMENT — SOCIAL DETERMINANTS OF HEALTH (SDOH): HOW OFTEN DO YOU GET TOGETHER WITH FRIENDS OR RELATIVES?: PATIENT DECLINED

## 2025-02-12 NOTE — PROGRESS NOTES
Preventive Care Visit  [facility]  PANDA Chavira CNP, Family Medicine  2025      Assessment & Plan     Routine general medical examination at a health care facility  - REVIEW OF HEALTH MAINTENANCE PROTOCOL ORDERS  - PRIMARY CARE FOLLOW-UP SCHEDULING; Future    Plantar fasciitis  Patient complains of heel pain, right described as stabbing.  Pain exacerbated by walking barefoot.  Relieved with rest.  - Physical Therapy  Referral; Future  - naproxen (NAPROSYN) 500 MG tablet; Take 1 tablet (500 mg) by mouth 2 times daily as needed for moderate pain (For foot pain).    Grief counseling  Patient recently lost his brother unexpectedly.  Given extensive mental health history, family requesting patient with a therapist.  He agrees.  - Adult Mental Health  Referral; Future    Paranoid schizophrenia (H)  Major depressive disorder, recurrent episode, severe with anxious distress (H)  Other insomnia  He follows with psychiatry.  He is currently getting Invega injections every month as well as Depakote.  Temazepam as needed for sleep.  He denies SI, endorses hearing voices occasionally.     Class 1 obesity due to excess calories without serious comorbidity with body mass index (BMI) of 31.0 to 31.9 in adult  -Discussed appropriate BMI, diet modification and physical activity.    Mixed hyperlipidemia  - Lipid panel reflex to direct LDL Fasting; Future  - fish oil-omega-3 fatty acids 500 MG capsule; Take 1 capsule (500 mg) by mouth 2 times daily. 2 at night    Subclinical hypothyroidism  - TSH with free T4 reflex; Future    Screening for HIV (human immunodeficiency virus)  - HIV Antigen Antibody Combo    Snoring  Pt snores loudly + Feels tired, fatigued, or sleepy during daytime   - Adult Sleep Eval & Management  Referral; Future    Family history of premature coronary artery disease  Per family and patient request. His brother recently  of heart disease  "at 39.  - Lipoprotein (a)    Screening for metabolic disorder  - Basic metabolic panel  (Ca, Cl, CO2, Creat, Gluc, K, Na, BUN)    Patient has been advised of split billing requirements and indicates understanding: Yes    BMI  Estimated body mass index is 31.32 kg/m  as calculated from the following:    Height as of this encounter: 1.727 m (5' 8\").    Weight as of this encounter: 93.4 kg (206 lb).       Counseling  Appropriate preventive services were addressed with this patient via screening, questionnaire, or discussion as appropriate for fall prevention, nutrition, physical activity, Tobacco-use cessation, social engagement, weight loss and cognition.  Checklist reviewing preventive services available has been given to the patient.  Reviewed patient's diet, addressing concerns and/or questions.         Cari Tipton is a 39 year old, presenting for the following:  Physical (Patient reporting high cholesterol )        2/12/2025     1:18 PM   Additional Questions   Roomed by clari   Accompanied by self         2/12/2025     1:18 PM   Patient Reported Additional Medications   Patient reports taking the following new medications none          HPI  Annual physical, establish care        Health Care Directive  Patient does not have a Health Care Directive: Discussed advance care planning with patient; however, patient declined at this time.      2/12/2025   General Health   How would you rate your overall physical health? (!) FAIR   Feel stress (tense, anxious, or unable to sleep) Only a little   (!) STRESS CONCERN      2/12/2025   Nutrition   Three or more servings of calcium each day? (!) I DON'T KNOW   Diet: I don't know   How many servings of fruit and vegetables per day? (!) 0-1   How many sweetened beverages each day? (!) I DON'T KNOW         2/12/2025   Exercise   Days per week of moderate/strenous exercise 0 days   Average minutes spent exercising at this level 0 min   (!) EXERCISE CONCERN      2/12/2025 " "  Social Factors   Frequency of gathering with friends or relatives Patient declined   Worry food won't last until get money to buy more Patient declined   Food not last or not have enough money for food? Yes   Do you have housing? (Housing is defined as stable permanent housing and does not include staying ouside in a car, in a tent, in an abandoned building, in an overnight shelter, or couch-surfing.) Yes   Are you worried about losing your housing? No   Lack of transportation? Patient declined   Unable to get utilities (heat,electricity)? No   (!) FOOD SECURITY CONCERN PRESENT      2/12/2025   Dental   Dentist two times every year? Yes          Today's PHQ-9 Score:       2/12/2025    12:59 PM   PHQ-9 SCORE   PHQ-9 Total Score MyChart 0   PHQ-9 Total Score 0        Patient-reported         2/12/2025   Substance Use   Alcohol more than 3/day or more than 7/wk Not Applicable   Do you use any other substances recreationally? No     Social History     Tobacco Use    Smoking status: Never    Smokeless tobacco: Never   Vaping Use    Vaping status: Never Used   Substance Use Topics    Alcohol use: No    Drug use: Yes     Types: Methamphetamines, Marijuana     Comment: Last THC 1/9/18, last meth 1/8/18 2/12/2025   One time HIV Screening   Previous HIV test? Decline         2/12/2025   STI Screening   New sexual partner(s) since last STI/HIV test? No         2/12/2025   Contraception/Family Planning   Questions about contraception or family planning No        Reviewed and updated as needed this visit by Provider                          Review of Systems  Constitutional, HEENT, cardiovascular, pulmonary, GI, , musculoskeletal, neuro, skin, endocrine and psych systems are negative, except as otherwise noted.     Objective    Exam  /83 (BP Location: Left arm, Patient Position: Sitting, Cuff Size: Adult Large)   Pulse 96   Temp 98.4  F (36.9  C) (Temporal)   Resp 20   Ht 1.727 m (5' 8\")   Wt 93.4 kg " "(206 lb)   SpO2 97%   BMI 31.32 kg/m     Estimated body mass index is 31.32 kg/m  as calculated from the following:    Height as of this encounter: 1.727 m (5' 8\").    Weight as of this encounter: 93.4 kg (206 lb).    Physical Exam  GENERAL: alert and no distress  EYES: Eyes grossly normal to inspection, PERRL and conjunctivae and sclerae normal  HENT: ear canals and TM's normal, nose and mouth without ulcers or lesions  NECK: no adenopathy, no asymmetry, masses, or scars  RESP: lungs clear to auscultation - no rales, rhonchi or wheezes  CV: regular rate and rhythm, normal S1 S2, no S3 or S4, no murmur, click or rub, no peripheral edema  ABDOMEN: soft, nontender, no hepatosplenomegaly, no masses and bowel sounds normal  MS: no gross musculoskeletal defects noted, no edema  SKIN: no suspicious lesions or rashes  NEURO: Normal strength and tone, mentation intact and speech normal  PSYCH: mentation appears normal, affect normal/bright    Answers submitted by the patient for this visit:  Patient Health Questionnaire (Submitted on 2/12/2025)  If you checked off any problems, how difficult have these problems made it for you to do your work, take care of things at home, or get along with other people?: Not difficult at all  PHQ9 TOTAL SCORE: 0      Signed Electronically by: PANDA Chavira CNP    "

## 2025-02-12 NOTE — PATIENT INSTRUCTIONS
Patient Education   Preventive Care Advice   This is general advice given by our system to help you stay healthy. However, your care team may have specific advice just for you. Please talk to your care team about your preventive care needs.  Nutrition  Eat 5 or more servings of fruits and vegetables each day.  Try wheat bread, brown rice and whole grain pasta (instead of white bread, rice, and pasta).  Get enough calcium and vitamin D. Check the label on foods and aim for 100% of the RDA (recommended daily allowance).  Lifestyle  Exercise at least 150 minutes each week  (30 minutes a day, 5 days a week).  Do muscle strengthening activities 2 days a week. These help control your weight and prevent disease.  No smoking.  Wear sunscreen to prevent skin cancer.  Have a dental exam and cleaning every 6 months.  Yearly exams  See your health care team every year to talk about:  Any changes in your health.  Any medicines your care team has prescribed.  Preventive care, family planning, and ways to prevent chronic diseases.  Shots (vaccines)   HPV shots (up to age 26), if you've never had them before.  Hepatitis B shots (up to age 59), if you've never had them before.  COVID-19 shot: Get this shot when it's due.  Flu shot: Get a flu shot every year.  Tetanus shot: Get a tetanus shot every 10 years.  Pneumococcal, hepatitis A, and RSV shots: Ask your care team if you need these based on your risk.  Shingles shot (for age 50 and up)  General health tests  Diabetes screening:  Starting at age 35, Get screened for diabetes at least every 3 years.  If you are younger than age 35, ask your care team if you should be screened for diabetes.  Cholesterol test: At age 39, start having a cholesterol test every 5 years, or more often if advised.  Bone density scan (DEXA): At age 50, ask your care team if you should have this scan for osteoporosis (brittle bones).  Hepatitis C: Get tested at least once in your life.  STIs (sexually  transmitted infections)  Before age 24: Ask your care team if you should be screened for STIs.  After age 24: Get screened for STIs if you're at risk. You are at risk for STIs (including HIV) if:  You are sexually active with more than one person.  You don't use condoms every time.  You or a partner was diagnosed with a sexually transmitted infection.  If you are at risk for HIV, ask about PrEP medicine to prevent HIV.  Get tested for HIV at least once in your life, whether you are at risk for HIV or not.  Cancer screening tests  Cervical cancer screening: If you have a cervix, begin getting regular cervical cancer screening tests starting at age 21.  Breast cancer scan (mammogram): If you've ever had breasts, begin having regular mammograms starting at age 40. This is a scan to check for breast cancer.  Colon cancer screening: It is important to start screening for colon cancer at age 45.  Have a colonoscopy test every 10 years (or more often if you're at risk) Or, ask your provider about stool tests like a FIT test every year or Cologuard test every 3 years.  To learn more about your testing options, visit:   .  For help making a decision, visit:   https://bit.ly/nx70604.  Prostate cancer screening test: If you have a prostate, ask your care team if a prostate cancer screening test (PSA) at age 55 is right for you.  Lung cancer screening: If you are a current or former smoker ages 50 to 80, ask your care team if ongoing lung cancer screenings are right for you.  For informational purposes only. Not to replace the advice of your health care provider. Copyright   2023 [employer]. All rights reserved. Clinically reviewed by the [facility] Transitions Program. [employer] [id number] - REV 01/24.

## 2025-02-13 LAB
ANION GAP SERPL CALCULATED.3IONS-SCNC: 13 MMOL/L (ref 7–15)
APO A-I SERPL-MCNC: <6 MG/DL
BUN SERPL-MCNC: 13.2 MG/DL (ref 6–20)
CALCIUM SERPL-MCNC: 9.9 MG/DL (ref 8.8–10.4)
CHLORIDE SERPL-SCNC: 102 MMOL/L (ref 98–107)
CREAT SERPL-MCNC: 1.13 MG/DL (ref 0.67–1.17)
EGFRCR SERPLBLD CKD-EPI 2021: 86 ML/MIN/1.73M2
GLUCOSE SERPL-MCNC: 83 MG/DL (ref 70–99)
HCO3 SERPL-SCNC: 26 MMOL/L (ref 22–29)
HIV 1+2 AB+HIV1 P24 AG SERPL QL IA: NONREACTIVE
POTASSIUM SERPL-SCNC: 3.9 MMOL/L (ref 3.4–5.3)
SODIUM SERPL-SCNC: 141 MMOL/L (ref 135–145)

## 2025-02-17 ENCOUNTER — MYC MEDICAL ADVICE (OUTPATIENT)
Dept: FAMILY MEDICINE | Facility: CLINIC | Age: 40
End: 2025-02-17
Payer: COMMERCIAL

## 2025-02-17 DIAGNOSIS — E78.2 MIXED HYPERLIPIDEMIA: Primary | ICD-10-CM

## 2025-02-17 RX ORDER — OMEGA-3-ACID ETHYL ESTERS 1 G/1
2 CAPSULE, LIQUID FILLED ORAL 2 TIMES DAILY
Qty: 180 CAPSULE | Refills: 3 | Status: SHIPPED | OUTPATIENT
Start: 2025-02-17

## 2025-05-02 ENCOUNTER — MYC MEDICAL ADVICE (OUTPATIENT)
Dept: FAMILY MEDICINE | Facility: CLINIC | Age: 40
End: 2025-05-02
Payer: COMMERCIAL

## 2025-05-02 DIAGNOSIS — M72.2 PLANTAR FASCIITIS: Primary | ICD-10-CM

## 2025-05-02 NOTE — TELEPHONE ENCOUNTER
Can an order be placed for insoles, or recommend follow up virtual visit?    Patient last seen on 2/12/25 in office visit, Plantar fasciitis discussed in office note:  Plantar fasciitis  Patient complains of heel pain, right described as stabbing.  Pain exacerbated by walking barefoot.  Relieved with rest.  - Physical Therapy  Referral; Future  - naproxen (NAPROSYN) 500 MG tablet; Take 1 tablet (500 mg) by mouth 2 times daily as needed for moderate pain (For foot pain).    Lenin Arguello, RN, BSN

## 2025-05-05 ENCOUNTER — PATIENT OUTREACH (OUTPATIENT)
Dept: CARE COORDINATION | Facility: CLINIC | Age: 40
End: 2025-05-05
Payer: COMMERCIAL

## 2025-05-07 ENCOUNTER — PATIENT OUTREACH (OUTPATIENT)
Dept: CARE COORDINATION | Facility: CLINIC | Age: 40
End: 2025-05-07
Payer: COMMERCIAL

## 2025-08-06 ENCOUNTER — OFFICE VISIT (OUTPATIENT)
Dept: PODIATRY | Facility: CLINIC | Age: 40
End: 2025-08-06
Payer: COMMERCIAL

## 2025-08-06 VITALS — WEIGHT: 206 LBS | HEIGHT: 68 IN | BODY MASS INDEX: 31.22 KG/M2

## 2025-08-06 DIAGNOSIS — Q66.70 CONGENITAL CAVUS FOOT: Primary | ICD-10-CM

## 2025-08-06 DIAGNOSIS — M72.2 PLANTAR FASCIITIS: ICD-10-CM

## 2025-08-06 PROCEDURE — 99203 OFFICE O/P NEW LOW 30 MIN: CPT | Performed by: PODIATRIST

## 2025-08-06 RX ORDER — PALIPERIDONE 6 MG/1
6 TABLET, EXTENDED RELEASE ORAL EVERY MORNING
COMMUNITY
Start: 2025-07-01

## 2025-08-11 ENCOUNTER — MYC MEDICAL ADVICE (OUTPATIENT)
Dept: PODIATRY | Facility: CLINIC | Age: 40
End: 2025-08-11
Payer: COMMERCIAL